# Patient Record
Sex: FEMALE | Race: WHITE | Employment: UNEMPLOYED | URBAN - NONMETROPOLITAN AREA
[De-identification: names, ages, dates, MRNs, and addresses within clinical notes are randomized per-mention and may not be internally consistent; named-entity substitution may affect disease eponyms.]

---
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
## 2022-01-19 ENCOUNTER — TELEPHONE (OUTPATIENT)
Dept: FAMILY MEDICINE CLINIC | Age: 22
End: 2022-01-19

## 2022-01-19 DIAGNOSIS — Z11.52 ENCOUNTER FOR SCREENING FOR COVID-19: Primary | ICD-10-CM

## 2022-01-19 PROCEDURE — 87635 SARS-COV-2 COVID-19 AMP PRB: CPT | Performed by: NURSE PRACTITIONER

## 2022-01-20 ENCOUNTER — TELEPHONE (OUTPATIENT)
Dept: FAMILY MEDICINE CLINIC | Age: 22
End: 2022-01-20

## 2022-01-20 ENCOUNTER — NURSE ONLY (OUTPATIENT)
Dept: FAMILY MEDICINE CLINIC | Age: 22
End: 2022-01-20
Payer: COMMERCIAL

## 2022-01-20 DIAGNOSIS — J30.9 ALLERGIC RHINITIS, UNSPECIFIED SEASONALITY, UNSPECIFIED TRIGGER: ICD-10-CM

## 2022-01-20 LAB
BASOPHILS # BLD: 0.4 %
BASOPHILS ABSOLUTE: 0 THOU/MM3 (ref 0–0.1)
EOSINOPHIL # BLD: 2 %
EOSINOPHILS ABSOLUTE: 0.2 THOU/MM3 (ref 0–0.4)
ERYTHROCYTE [DISTWIDTH] IN BLOOD BY AUTOMATED COUNT: 13.5 % (ref 11.5–14.5)
ERYTHROCYTE [DISTWIDTH] IN BLOOD BY AUTOMATED COUNT: 46.2 FL (ref 35–45)
HCT VFR BLD CALC: 40.4 % (ref 37–47)
HEMOGLOBIN: 13 GM/DL (ref 12–16)
IMMATURE GRANS (ABS): 0.02 THOU/MM3 (ref 0–0.07)
IMMATURE GRANULOCYTES: 0.2 %
LYMPHOCYTES # BLD: 35.9 %
LYMPHOCYTES ABSOLUTE: 3.2 THOU/MM3 (ref 1–4.8)
Lab: NORMAL
MCH RBC QN AUTO: 29.9 PG (ref 26–33)
MCHC RBC AUTO-ENTMCNC: 32.2 GM/DL (ref 32.2–35.5)
MCV RBC AUTO: 92.9 FL (ref 81–99)
MONOCYTES # BLD: 6 %
MONOCYTES ABSOLUTE: 0.5 THOU/MM3 (ref 0.4–1.3)
NUCLEATED RED BLOOD CELLS: 0 /100 WBC
PLATELET # BLD: 331 THOU/MM3 (ref 130–400)
PMV BLD AUTO: 10.3 FL (ref 9.4–12.4)
QC PASS/FAIL: NORMAL
RBC # BLD: 4.35 MILL/MM3 (ref 4.2–5.4)
SARS-COV-2 RDRP RESP QL NAA+PROBE: NEGATIVE
SEG NEUTROPHILS: 55.5 %
SEGMENTED NEUTROPHILS ABSOLUTE COUNT: 4.9 THOU/MM3 (ref 1.8–7.7)
WBC # BLD: 8.9 THOU/MM3 (ref 4.8–10.8)

## 2022-01-20 PROCEDURE — 36415 COLL VENOUS BLD VENIPUNCTURE: CPT | Performed by: FAMILY MEDICINE

## 2022-01-20 NOTE — TELEPHONE ENCOUNTER
Patient got ears pierced (4 piercings) 3 weeks ago. Patient replaced original earrings 1 1/2 wks ago and since then 3 of the 4 have been bleeding when she takes them out to clean the piercing areas and is wondering if she needs an antibiotic cream.  Currently using bacitracin daily on the areas. Replacements are lower quality than the original earrings. Please advise. Thank you.

## 2022-01-20 NOTE — LETTER
NOTIFICATION RETURN TO WORK / SCHOOL    1/20/2022    Ms[address] [facility]  [address] [address]      To Whom It May Concern:    Jacinta Yeager was tested for COVID-19 on 1-20-22, and the result was negative. If there are questions or concerns, please have the patient contact our office.         Sincerely,      Margy Mullins

## 2022-01-21 LAB
IGA: 258 MG/DL (ref 70–400)
IGE: 2 IU/ML
IGG: 1366 MG/DL (ref 700–1600)
IGM: 76 MG/DL (ref 40–230)

## 2022-01-21 NOTE — TELEPHONE ENCOUNTER
I believe she is not supposed to take out the original piercing for 12 weeks. She should take out the current earrings and put back in the original. Clean the areas without taking them out eat time. Use the cleaning solution provided by the place she got the piercing.    If she has any concerns she will need to come into the office to be seen

## 2022-01-23 LAB — DIPHTHERIA ANTIBODY: NORMAL

## 2022-01-24 LAB — MISC. #1 REFERENCE GROUP TEST: NORMAL

## 2022-03-09 ENCOUNTER — NURSE TRIAGE (OUTPATIENT)
Dept: OTHER | Facility: CLINIC | Age: 22
End: 2022-03-09
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
## 2022-05-04 ENCOUNTER — E-VISIT (OUTPATIENT)
Dept: FAMILY MEDICINE CLINIC | Age: 22
End: 2022-05-04
Payer: COMMERCIAL

## 2022-05-04 DIAGNOSIS — J01.90 ACUTE SINUSITIS, RECURRENCE NOT SPECIFIED, UNSPECIFIED LOCATION: Primary | ICD-10-CM

## 2022-05-04 PROCEDURE — 99421 OL DIG E/M SVC 5-10 MIN: CPT | Performed by: FAMILY MEDICINE

## 2022-05-04 ASSESSMENT — LIFESTYLE VARIABLES: SMOKING_STATUS: NO, I'VE NEVER SMOKED

## 2022-05-05 ENCOUNTER — TELEPHONE (OUTPATIENT)
Dept: FAMILY MEDICINE CLINIC | Age: 22
End: 2022-05-05

## 2022-05-05 NOTE — TELEPHONE ENCOUNTER
Patient called. Stated that ear piercing site is infected. Offered appointment but declined as she is at [facility] in [address] [place], [place] and not in [facility] this week. Advised to reach out to medical personnel on campus or in [address].

## 2022-05-05 NOTE — PATIENT INSTRUCTIONS
Patient Education        Saline Nasal Washes: Care Instructions  Overview  Patient Education        Sinusitis: Care Instructions  Your Care Instructions     Sinusitis is an infection of the lining of the sinus cavities in your head. Sinusitis often follows a cold. It causes pain and pressure in your head andface. In most cases, sinusitis gets better on its own in 1 to 2 weeks. But some mildsymptoms may last for several weeks. Sometimes antibiotics are needed. Follow-up care is a key part of your treatment and safety. Be sure to make and go to all appointments, and call your doctor if you are having problems. It's also a good idea to know your test results and keep alist of the medicines you take. How can you care for yourself at home?  Take an over-the-counter pain medicine, such as acetaminophen (Tylenol), ibuprofen (Advil, Motrin), or naproxen (Aleve). Read and follow all instructions on the label.  If the doctor prescribed antibiotics, take them as directed. Do not stop taking them just because you feel better. You need to take the full course of antibiotics.  Be careful when taking over-the-counter cold or flu medicines and Tylenol at the same time. Many of these medicines have acetaminophen, which is Tylenol. Read the labels to make sure that you are not taking more than the recommended dose. Too much acetaminophen (Tylenol) can be harmful.  Breathe warm, moist air from a steamy shower, a hot bath, or a sink filled with hot water. Avoid cold, dry air. Using a humidifier in your home may help. Follow the directions for cleaning the machine.  Use saline (saltwater) nasal washes. This can help keep your nasal passages open and wash out mucus and bacteria. You can buy saline nose drops at a grocery store or drugstore. Or you can make your own at home by adding 1 teaspoon of salt and 1 teaspoon of baking soda to 2 cups of distilled water.  If you make your own, fill a bulb syringe with the solution, insert the tip into your nostril, and squeeze gently. Ferdie Hammersmith your nose.  Put a hot, wet towel or a warm gel pack on your face 3 or 4 times a day for 5 to 10 minutes each time.  Try a decongestant nasal spray like oxymetazoline (Afrin). Do not use it for more than 3 days in a row. Using it for more than 3 days can make your congestion worse. When should you call for help? Call your doctor now or seek immediate medical care if:     You have new or worse swelling or redness in your face or around your eyes.      You have a new or higher fever. Watch closely for changes in your health, and be sure to contact your doctor if:     You have new or worse facial pain.      The mucus from your nose becomes thicker (like pus) or has new blood in it.      You are not getting better as expected. Where can you learn more? Go to https://[facility] [facility].ReClaims. org and sign in to your Geostellar account. Enter K099 in the My Best Interest box to learn more about \"Sinusitis: Care Instructions. \"     If you do not have an account, please click on the \"Sign Up Now\" link. Current as of: September 8, 2021               Content Version: 13.2  © 2006-2022 Healthwise, Incorporated. Care instructions adapted under license by [facility] ([facility]). If you have questions about a medical condition or this instruction, always ask your healthcare professional. [place] [address] any warranty or liability for your use of this information. Saline nasal washes help keep the nasal passages open by washing out thick or dried mucus. This simple remedy can help relieve symptoms of allergies, sinusitis, and colds. It also can make the nose feel more comfortable by keeping the mucous membranes moist. You may notice a little burning sensation in your nose the first few times you use the solution, but this usually getsbetter in a few days. Follow-up care is a key part of your treatment and safety.  Be sure to make and go to all appointments, and call your doctor if you are having problems. It's also a good idea to know your test results and keep alist of the medicines you take. How can you care for yourself at home?  You can buy premixed saline solution in a squeeze bottle or other sinus rinse products at a drugstore. Read and follow the instructions on the label.  You also can make your own saline solution by adding 1 teaspoon of non-iodized salt and 1 teaspoon of baking soda to 2 cups of distilled or boiled and cooled water.  If you use a homemade solution, use a squeeze bottle or neti pot to get the solution into your nose. Room temperature or slightly warmed water may be more comfortable. Make sure it isn't hot.  Stand over the sink with your head tilted forward and slightly to one side. Put only the tip of the syringe or squeeze bottle into the nostril that is farther away from the sink. (The nostril closest to the sink will drain the fluid.) Gently squirt the solution into the nostril and toward the back of your head with your mouth open. The solution should flow out the other nostril. Repeat on the other side. Some sneezing and gagging are normal at first.   Gently blow your nose.  Clean the syringe or bottle after each use.  Repeat this 2 or 3 times a day.  Use nasal washes gently if you have nosebleeds often. When should you call for help? Watch closely for changes in your health, and be sure to contact your doctor if:     Your symptoms do not get better.      You have problems doing the nasal washes. Where can you learn more? Go to https://Facisharetomeweb.XPlace. org and sign in to your Latiot account. Enter [phone] [id number] [phone] in the [facility] box to learn more about \"Saline Nasal Washes: Care Instructions. \"     If you do not have an account, please click on the \"Sign Up Now\" link.   Current as of: September 8, 2021               Content Version: 13.2  © 6701-6734 Healthwise, Incorporated. Care instructions adapted under license by [facility] ([facility]). If you have questions about a medical condition or this instruction, always ask your healthcare professional. [address] any warranty or liability for your use of this information.

## 2022-05-07 NOTE — TELEPHONE ENCOUNTER
Apparently pre-service had to told patient she could not be seen virtually. Went to urgent care. Apologized for miscommunication. Will forward to  for review.

## 2022-05-09 DIAGNOSIS — B37.9 YEAST INFECTION: Primary | ICD-10-CM

## 2022-05-09 RX ORDER — FLUCONAZOLE 150 MG/1
150 TABLET ORAL ONCE
Qty: 2 TABLET | Refills: 0 | Status: SHIPPED | OUTPATIENT
Start: 2022-05-09 | End: 2022-05-09

## 2022-05-12 ENCOUNTER — TELEPHONE (OUTPATIENT)
Dept: FAMILY MEDICINE CLINIC | Age: 22
End: 2022-05-12

## 2022-05-12 NOTE — TELEPHONE ENCOUNTER
----- Message from [employer] sent at 5/12/2022  3:02 PM EDT -----  Subject: Appointment Request    Reason for Call: Urgent (Patient Request) ED Follow Up Visit    QUESTIONS  Type of Appointment? Established Patient  Reason for appointment request? Available appointments did not meet   patient need  Additional Information for Provider? Pt is away at college and would like   to set-up a [facility] appt tomorrow if possible, Pt was at the Urgent care on   Sun. 5/8/2022, strep throat, Pt is still feeling dizzy.   ---------------------------------------------------------------------------  --------------  CALL BACK INFO  What is the best way for the office to contact you? OK to leave message on   voicemail  Preferred Call Back Phone Number? [phone]  ---------------------------------------------------------------------------  --------------  SCRIPT ANSWERS  Relationship to Patient? Self  (Patient requests to see provider urgently. )? Yes  Have you been diagnosed with, awaiting test results for, or told that you   are suspected of having COVID-19 (Coronavirus)? (If patient has tested   negative or was tested as a requirement for work, school, or travel and   not based on symptoms, answer no)? No  Within the past 10 days have you developed any of the following symptoms   (answer no if symptoms have been present longer than 10 days or began   more than 10 days ago)? Fever or Chills, Cough, Shortness of breath or   difficulty breathing, Loss of taste or smell, Sore throat, Nasal   congestion, Sneezing or runny nose, Fatigue or generalized body aches   (answer no if pain is specific to a body part e.g. back pain), Diarrhea,   Headache?  Yes

## 2022-05-16 ENCOUNTER — OFFICE VISIT (OUTPATIENT)
Dept: FAMILY MEDICINE CLINIC | Age: 22
End: 2022-05-16
Payer: COMMERCIAL

## 2022-05-16 VITALS
SYSTOLIC BLOOD PRESSURE: 110 MMHG | HEART RATE: 91 BPM | RESPIRATION RATE: 16 BRPM | TEMPERATURE: 98 F | DIASTOLIC BLOOD PRESSURE: 70 MMHG | OXYGEN SATURATION: 98 % | HEIGHT: 64 IN | WEIGHT: 103 LBS | BODY MASS INDEX: 17.58 KG/M2

## 2022-05-16 DIAGNOSIS — J03.01 ACUTE RECURRENT STREPTOCOCCAL TONSILLITIS: Primary | ICD-10-CM

## 2022-05-16 DIAGNOSIS — J03.90 EXUDATIVE TONSILLITIS: ICD-10-CM

## 2022-05-16 DIAGNOSIS — F41.9 ANXIETY: ICD-10-CM

## 2022-05-16 LAB — STREPTOCOCCUS A RNA: NEGATIVE

## 2022-05-16 PROCEDURE — 1036F TOBACCO NON-USER: CPT | Performed by: NURSE PRACTITIONER

## 2022-05-16 PROCEDURE — 99214 OFFICE O/P EST MOD 30 MIN: CPT | Performed by: NURSE PRACTITIONER

## 2022-05-16 PROCEDURE — G8427 DOCREV CUR MEDS BY ELIG CLIN: HCPCS | Performed by: NURSE PRACTITIONER

## 2022-05-16 PROCEDURE — G8419 CALC BMI OUT NRM PARAM NOF/U: HCPCS | Performed by: NURSE PRACTITIONER

## 2022-05-16 PROCEDURE — 87651 STREP A DNA AMP PROBE: CPT | Performed by: NURSE PRACTITIONER

## 2022-05-16 RX ORDER — PREDNISONE 20 MG/1
20 TABLET ORAL 2 TIMES DAILY
Qty: 10 TABLET | Refills: 0 | Status: SHIPPED | OUTPATIENT
Start: 2022-05-16 | End: 2022-05-21

## 2022-05-16 RX ORDER — CYCLOBENZAPRINE HCL 5 MG
5 TABLET ORAL 3 TIMES DAILY PRN
COMMUNITY
Start: 2022-05-05 | End: 2022-07-20

## 2022-05-16 RX ORDER — AMOXICILLIN AND CLAVULANATE POTASSIUM 875; 125 MG/1; MG/1
1 TABLET, FILM COATED ORAL EVERY 12 HOURS
COMMUNITY
Start: 2022-05-08 | End: 2022-07-20

## 2022-05-16 ASSESSMENT — PATIENT HEALTH QUESTIONNAIRE - PHQ9
1. LITTLE INTEREST OR PLEASURE IN DOING THINGS: 0
2. FEELING DOWN, DEPRESSED OR HOPELESS: 0
SUM OF ALL RESPONSES TO PHQ QUESTIONS 1-9: 0
9. THOUGHTS THAT YOU WOULD BE BETTER OFF DEAD, OR OF HURTING YOURSELF: 0
SUM OF ALL RESPONSES TO PHQ QUESTIONS 1-9: 0
SUM OF ALL RESPONSES TO PHQ QUESTIONS 1-9: 0
7. TROUBLE CONCENTRATING ON THINGS, SUCH AS READING THE NEWSPAPER OR WATCHING TELEVISION: 0
8. MOVING OR SPEAKING SO SLOWLY THAT OTHER PEOPLE COULD HAVE NOTICED. OR THE OPPOSITE, BEING SO FIGETY OR RESTLESS THAT YOU HAVE BEEN MOVING AROUND A LOT MORE THAN USUAL: 0
4. FEELING TIRED OR HAVING LITTLE ENERGY: 0
SUM OF ALL RESPONSES TO PHQ QUESTIONS 1-9: 0
10. IF YOU CHECKED OFF ANY PROBLEMS, HOW DIFFICULT HAVE THESE PROBLEMS MADE IT FOR YOU TO DO YOUR WORK, TAKE CARE OF THINGS AT HOME, OR GET ALONG WITH OTHER PEOPLE: 0
5. POOR APPETITE OR OVEREATING: 0
6. FEELING BAD ABOUT YOURSELF - OR THAT YOU ARE A FAILURE OR HAVE LET YOURSELF OR YOUR FAMILY DOWN: 0
3. TROUBLE FALLING OR STAYING ASLEEP: 0
SUM OF ALL RESPONSES TO PHQ9 QUESTIONS 1 & 2: 0

## 2022-05-16 ASSESSMENT — ENCOUNTER SYMPTOMS
SHORTNESS OF BREATH: 0
SINUS PAIN: 1
CHEST TIGHTNESS: 0
EYE ITCHING: 0
VOMITING: 0
SORE THROAT: 1
CHOKING: 0
NAUSEA: 0
COLOR CHANGE: 0
BACK PAIN: 0
SINUS PRESSURE: 1
ABDOMINAL DISTENTION: 0
EYE DISCHARGE: 0
COUGH: 0
ABDOMINAL PAIN: 0
VOICE CHANGE: 0
EYE PAIN: 0
WHEEZING: 0
CONSTIPATION: 0

## 2022-05-16 NOTE — PROGRESS NOTES
[address] [address]  Dept: [phone]  Loc: [facility] Marshal Cornelius (:  2000) is a 25 y.o. female,Established patient, here for evaluation of the following chief complaint(s):  Head Congestion (on antibiotics for strep ), Ear Fullness, and Dizziness      ASSESSMENT/PLAN:  1. Acute recurrent streptococcal tonsillitis  -     POCT Rapid Strep A DNA (Alere i)  -     predniSONE (DELTASONE) 20 MG tablet; Take 1 tablet by mouth 2 times daily for 5 days, Disp-10 tablet, R-0Normal  2. Exudative tonsillitis  -     predniSONE (DELTASONE) 20 MG tablet; Take 1 tablet by mouth 2 times daily for 5 days, Disp-10 tablet, R-0Normal  3. Anxiety  Stable. Denies any SI/HI    Pt non toxic appearing and in no acute distress. Patient treated for strep today is day 8 she has been taking Augmentin, states she is still struggling with a lot of congestion and mucus and also her throat is still a bit sore. Did re-check strep test and it was negative. Patient instructed to continue Augmentin, was given prednisone 5-day course. Return in about 1 week (around 2022), or if symptoms worsen or fail to improve. SUBJECTIVE/OBJECTIVE:  Testes for strep last  at school and started Augmentin. Now having heavy congestion and dizziness. Having a lot of mucus. Did change toothbrush. has a past medical history of Allergy, Anxiety, Mono exposure, and Strep throat. Review of Systems   Constitutional: Negative for appetite change, chills, fatigue and fever. HENT: Positive for congestion, sinus pressure, sinus pain and sore throat. Negative for ear discharge, tinnitus and voice change. Eyes: Negative for pain, discharge, itching and visual disturbance. Respiratory: Negative for cough, choking, chest tightness, shortness of breath and wheezing.     Cardiovascular: Negative for chest pain, palpitations and leg swelling. Gastrointestinal: Negative for abdominal distention, abdominal pain, constipation, nausea and vomiting. Endocrine: Negative for cold intolerance and heat intolerance. Genitourinary: Negative for dysuria, hematuria, vaginal discharge and vaginal pain. Musculoskeletal: Negative for arthralgias, back pain, gait problem, neck pain and neck stiffness. Skin: Negative for color change and rash. Neurological: Negative for dizziness, syncope, speech difficulty, light-headedness, numbness and headaches. Psychiatric/Behavioral: Negative for behavioral problems, confusion, self-injury and suicidal ideas. The patient is not nervous/anxious. Physical Exam  Vitals and nursing note reviewed. Constitutional:       General: She is not in acute distress. Appearance: Normal appearance. She is well-developed. She is not diaphoretic. HENT:      Head: Normocephalic and atraumatic. Right Ear: Tympanic membrane and external ear normal. Tympanic membrane is not injected or erythematous. Left Ear: Tympanic membrane and external ear normal. Tympanic membrane is not injected or erythematous. Nose:      Right Sinus: Maxillary sinus tenderness and frontal sinus tenderness present. Left Sinus: Maxillary sinus tenderness and frontal sinus tenderness present. Mouth/Throat:      Pharynx: Uvula midline. Posterior oropharyngeal erythema present. Tonsils: Tonsillar exudate present. 4+ on the right. 4+ on the left. Eyes:      General:         Right eye: No discharge. Left eye: No discharge. Conjunctiva/sclera: Conjunctivae normal.      Pupils: Pupils are equal, round, and reactive to light. Neck:      Thyroid: No thyromegaly. Trachea: Trachea normal.   Cardiovascular:      Rate and Rhythm: Normal rate and regular rhythm. Pulses: Normal pulses. Heart sounds: Normal heart sounds, S1 normal and S2 normal. No murmur heard.   No friction rub. No gallop. Pulmonary:      Effort: Pulmonary effort is normal. No respiratory distress. Breath sounds: Normal breath sounds. Abdominal:      General: Bowel sounds are normal.      Palpations: Abdomen is soft. Musculoskeletal:         General: No tenderness or deformity. Normal range of motion. Cervical back: Full passive range of motion without pain, normal range of motion and neck supple. No rigidity or tenderness. Lymphadenopathy:      Cervical: No cervical adenopathy. Skin:     General: Skin is warm and dry. Capillary Refill: Capillary refill takes less than 2 seconds. Neurological:      Mental Status: She is alert and oriented to person, place, and time. Deep Tendon Reflexes: Reflexes are normal and symmetric. Psychiatric:         Mood and Affect: Mood normal.         Behavior: Behavior normal.             An electronic signature was used to authenticate this note.     --BRENDA Sanchez - CNP

## 2022-05-16 NOTE — PATIENT INSTRUCTIONS
Patient Education        Tonsillitis: Care Instructions  Overview     Tonsillitis is an infection of the tonsils that is caused by bacteria or a virus. The tonsils are in the back of the throat and are part of the immunesystem. Tonsillitis typically lasts from a few days up to a couple of weeks. Tonsillitis caused by a virus goes away on its own. Tonsillitis caused by thebacteria that causes strep throat is treated with antibiotics. You and your doctor may consider surgery to remove the tonsils (tonsillectomy)if you have serious complications or repeat infections. Follow-up care is a key part of your treatment and safety. Be sure to make and go to all appointments, and call your doctor if you are having problems. It's also a good idea to know your test results and keep alist of the medicines you take. How can you care for yourself at home? Home care can help with a sore throat and other symptoms. Here are some thingsyou can do to help yourself feel better.  If your doctor prescribed antibiotics, take them as directed. Do not stop taking them just because you feel better. You need to take the full course of antibiotics.  Gargle with warm salt water. This helps reduce swelling and relieve discomfort. Gargle once an hour with 1 teaspoon of salt mixed in 8 fluid ounces of warm water.  Take an over-the-counter pain medicine, such as acetaminophen (Tylenol), ibuprofen (Advil, Motrin), or naproxen (Aleve). Be safe with medicines. Read and follow all instructions on the label. No one younger than 20 should take aspirin. It has been linked to Reye syndrome, a serious illness.  Be careful when taking over-the-counter cold or flu medicines and Tylenol at the same time. Many of these medicines have acetaminophen, which is Tylenol. Read the labels to make sure that you are not taking more than the recommended dose. Too much acetaminophen (Tylenol) can be harmful.    Try lozenges or an over-the-counter throat spray to relieve throat pain.  Drink plenty of fluids. Fluids may help soothe an irritated throat. Drink warm or cool liquids (whichever feels better). These include tea, soup, and juice.  Do not smoke, and avoid secondhand smoke. Smoking can make tonsillitis worse. If you need help quitting, talk to your doctor about stop-smoking programs and medicines. These can increase your chances of quitting for good.  Use a vaporizer or humidifier to add moisture to your bedroom. Follow the directions for cleaning the machine.  Get plenty of rest.  When should you call for help? Call your doctor now or seek immediate medical care if:     Your pain gets worse on one side of your throat.      You have a new or higher fever.      You notice changes in your voice.      You have trouble opening your mouth.      You have any trouble breathing.      You have much more trouble swallowing.      You have a fever with a stiff neck or a severe headache.      You are sensitive to light or feel very sleepy or confused. Watch closely for changes in your health, and be sure to contact your doctor if:     You do not get better after 2 days. Where can you learn more? Go to https://TeqcyclepeTransEnergyeb.Deliveroo. org and sign in to your InGaugeIt account. Enter X067 in the TapFunder box to learn more about \"Tonsillitis: Care Instructions. \"     If you do not have an account, please click on the \"Sign Up Now\" link. Current as of: September 8, 2021               Content Version: 13.2  © 2006-2022 Healthwise, [facility]. Care instructions adapted under license by [facility] ([facility]). If you have questions about a medical condition or this instruction, always ask your healthcare professional. [place] [address] any warranty or liability for your use of this information.

## 2022-05-18 NOTE — TELEPHONE ENCOUNTER
Okay to come in today. Hydroxychloroquine Pregnancy And Lactation Text: This medication has been shown to cause fetal harm but it isn't assigned a Pregnancy Risk Category. There are small amounts excreted in breast milk.

## 2022-05-26 ENCOUNTER — HOSPITAL ENCOUNTER (OUTPATIENT)
Dept: PHYSICAL THERAPY | Age: 22
Setting detail: THERAPIES SERIES
Discharge: HOME | End: 2022-05-26
Payer: COMMERCIAL

## 2022-05-26 PROCEDURE — 97140 MANUAL THERAPY 1/> REGIONS: CPT

## 2022-05-26 PROCEDURE — 97162 PT EVAL MOD COMPLEX 30 MIN: CPT

## 2022-05-26 NOTE — FLOWSHEET NOTE
helping to a point but the pain was still present and she found out that her jaw was not aligned properly and she would need surgery.  had surgery on her jaw to pull the bottom jaw forward and realign almost one year ago.  is still wearing rubber bands and the orthodontist is doing work to keep trying to realign everything.  still having pain and her surgeon wanted her to try therapy due to continued tight muscles. Gilda has muscle relaxers but does not take often due to they make her too tired. Social/Functional History and Current Status:  Medications and Allergies have been reviewed and are listed on Medical History Questionnaire. [place] [facility] R[facility]59 lives with family in a single story home with a level surface to enter. Task Previous Current   ADLs  Independent Independent   IADL's Independent Modified Independent   Ambulation Independent Independent   Transfers Independent Independent   Recreation Independent Modified Independent   Community Integration Independent Independent   Driving Active  Active    Work college student  College student     OBJECTIVE:    Pain: 6-8/10 jaw pain   Palpation Mild to moderate tenderness to palpation of neck muscles and internal jaw muscles. Observation Mild deviation of jaw with opening and closing plus popping felt on right with opening. Uneven TMJ motion between sides. Posture Fair        Range of Motion Jaw opening 47 mm and lateral deviation 10 mm each way - pt reported tougher to go to the right. WFL cervical motion all directions   Strength    Coordination    Sensation States having headaches that are in her forehead and bilat temples. 3-4x/week and can last all day or can be managed with medication.    Bed Mobility    Transfers    Ambulation    Stairs    Balance    Special Tests          TREATMENT   Precautions: Be careful eating chewy foods   Pain: 6/10 in jaw    X in shaded column indicates activity completed today   Modalities Parameters/  Location  Notes                     Manual Therapy Time/Technique  Notes   Soft tissue and TP work to internal jaw musculature focusing on right side. Myofascial work to bilateral sides neck. X Increased tightness in internal jaw more to right side and along both sides of neck+               Exercise/Intervention   Notes   Educated on doing manual therapy inside mouth and doing rot/SB/Levator stretches for neck   X                                                                            Specific Interventions Next Treatment: manual therapy to cervical area and jaw, posture education, stretching and strengthening    Activity/Treatment Tolerance:  [x]  Patient tolerated treatment well  []  Patient limited by fatigue  []  Patient limited by pain   []  Patient limited by medical complications  []  Other:     Assessment: Patient with long history of TMJ issues that finally ended up with a surgery to pull bottom of jaw forward. Orthodontist is still working with patient on jaw alignment. Patient is still having pain in jaw one year post surgery so is coming to therapy. Patient still has tight musculature around jaw and neck that could be still causing problems because were not addressed after surgery and needed stretched and relaxed to accomodate to new jaw position. Patient still has uneven TMJ motion also. Patient would benefit from therapy to work on getting tight musculature calmed down and restoring even TMJ motion. Patient may still have pop[ing after therapy is done but will have to wait to see. Body Structures/Functions/Activity Limitations: impaired activity tolerance, impaired ROM and pain  Prognosis: good    GOALS:  Patient Goal: To get muscles more relaxed and see if helps with her pain.     Short Term Goals:  Time Frame: 4 weeks  1) Patient to report 25-50% decrease in jaw pain for ease with talking and singing  2) Patient to demonstrate even TMJ motion with full jaw opening to decrease stress on joints when eating  3) Patient to be fully educated on stretching and strengthening for mobility and stability  4) Patient to demonstrate 50-75% decrease in neck tightness for decreased stress on her joints. 5) Patient to report able to eat chewier foods without pain in her jaw      Long Term Goals:  Time Frame: 12 weeks  1) Patient to be independent with home program to perform all eating and talking with minimal to no pain and popping in jaw       Patient Education:   [x]  HEP/Education Completed: Plan of Care, Goals, HEP above   [address] Access Code:  []  No new Education completed  []  Reviewed Prior HEP      [x]  Patient verbalized and/or demonstrated understanding of education provided. []  Patient unable to verbalize and/or demonstrate understanding of education provided. Will continue education. []  Barriers to learning: None    PLAN:  Treatment Recommendations: Strengthening, Range of Motion, Manual Therapy - Soft Tissue Mobilization, Pain Management, Home Exercise Program, Patient Education, Integrative Dry Needling and Modalities    [x]  Plan of care initiated. Plan to see patient 1 times per week for 12 weeks to address the treatment planned outlined above.   []  Continue with current plan of care  []  Modify plan of care as follows:    []  Hold pending physician visit  []  Discharge    Time In 1020   Time Out 1120   Timed Code Minutes: 15 min   Total Treatment Time: 60 min       Electronically Signed by: Odell Hoffmann [place] [id number]

## 2022-05-31 ENCOUNTER — HOSPITAL ENCOUNTER (OUTPATIENT)
Dept: PHYSICAL THERAPY | Age: 22
Setting detail: THERAPIES SERIES
Discharge: HOME | End: 2022-05-31
Payer: COMMERCIAL

## 2022-05-31 PROBLEM — F41.9 ANXIETY: Status: ACTIVE | Noted: 2022-05-31

## 2022-05-31 PROCEDURE — 97110 THERAPEUTIC EXERCISES: CPT

## 2022-05-31 PROCEDURE — 97140 MANUAL THERAPY 1/> REGIONS: CPT

## 2022-05-31 NOTE — PROGRESS NOTES
[address] [facility]  PHYSICAL THERAPY  [] EVALUATION  [x] DAILY NOTE (LAND) [] DAILY NOTE (AQUATIC ) [] PROGRESS NOTE [] DISCHARGE NOTE    [] [address] [facility]   [] [address]    [x] [facility]   [] Malachi Teague    Date: 2022  Patient Name:  Nilsa Larson  : 2000  MRN: [id number]  CSN: [id number]    Referring Practitioner BRENDA Booker -*   Diagnosis Other specified disorders of temporomandibular joint [M26.69]    Treatment Diagnosis Jaw pain   Date of Evaluation 22    Additional Pertinent History Jaw surgery one year ago      Functional Outcome Measure Used TMD   Functional Outcome Score 16/40 for 40% disability (22)       Insurance: Primary: Payor: Ree Franklin /  /  / ,   Secondary: [facility]   Authorization Information: Allowed 30 visits    Visit # 2, 2/10 for progress note   Visits Allowed: 30 hard limit   Recertification Date: 35   Physician Follow-Up: None with surgeon. Follow up with orthodontist  In one month. Physician Orders:    History of Present Illness:      SUBJECTIVE: Patient reports has been having trouble getting her rubber bands to stay in place in her mouth but finally got them in last night and her jaw feels better with them. States has been trying to do stretches but did not have time over the weekend. Reports things just feel tight today which is causing pain. TREATMENT   Precautions: Be careful eating chewy foods   Pain: 4/10 in jaw    X in shaded column indicates activity completed today   Modalities Parameters/  Location  Notes                     Manual Therapy Time/Technique  Notes   Soft tissue and TP work to internal jaw musculature to bilat sides and tightness noted more along upper jaw than lower jaw. Myofascial work to bilateral sides neck with tightness noted more distally on right side and at attachment on left.   X    Mobilization for distraction and lateral motion on right side 2x10 each X Patient still with popping in jaw but not as loud and movement of bilat TMJ felt more equal after this treatment         Exercise/Intervention   Notes   Educated on doing manual therapy inside mouth and doing rot/SB/Levator stretches for neck   X                                                                            Specific Interventions Next Treatment: manual therapy to cervical area and jaw, posture education, stretching and strengthening    Activity/Treatment Tolerance:  [x]  Patient tolerated treatment well  []  Patient limited by fatigue  []  Patient limited by pain   []  Patient limited by medical complications  []  Other:     Assessment: Patient with tightness noted both side of neck and both sides internally of jaw. Patient with improved and more equal mobility of TMJ in middle of session after jaw work but then was more uneven after work on neck musculature. Will continue to work on tightness to both side of jaw are even and can then get more equal motion of bilateral TMJ. Then can progress to strengthening. Unsure how much better popping will get. GOALS:  Patient Goal: To get muscles more relaxed and see if helps with her pain. Short Term Goals:  Time Frame: 4 weeks  1) Patient to report 25-50% decrease in jaw pain for ease with talking and singing  2) Patient to demonstrate even TMJ motion with full jaw opening to decrease stress on joints when eating  3) Patient to be fully educated on stretching and strengthening for mobility and stability  4) Patient to demonstrate 50-75% decrease in neck tightness for decreased stress on her joints.   5) Patient to report able to eat chewier foods without pain in her jaw      Long Term Goals:  Time Frame: 12 weeks  1) Patient to be independent with home program to perform all eating and talking with minimal to no pain and popping in jaw       Patient Education:   [x]  HEP/Education Completed: Continue with manual work and stretches.  [employer] Access Code:  []  No new Education completed  []  Reviewed Prior HEP      [x]  Patient verbalized and/or demonstrated understanding of education provided. []  Patient unable to verbalize and/or demonstrate understanding of education provided. Will continue education. []  Barriers to learning: None    PLAN:  []  Plan of care initiated. Plan to see patient 1 times per week for 12 weeks to address the treatment planned outlined above.   [x]  Continue with current plan of care  []  Modify plan of care as follows:    []  Hold pending physician visit  []  Discharge    Time In 1045   Time Out 1130   Timed Code Minutes: 45 min   Total Treatment Time: 45 min       Electronically Signed by: [place] [id number]

## 2022-06-06 ENCOUNTER — APPOINTMENT (OUTPATIENT)
Dept: PHYSICAL THERAPY | Age: 22
End: 2022-06-06
Payer: COMMERCIAL

## 2022-06-08 ENCOUNTER — HOSPITAL ENCOUNTER (OUTPATIENT)
Dept: PHYSICAL THERAPY | Age: 22
Setting detail: THERAPIES SERIES
Discharge: HOME | End: 2022-06-08
Payer: COMMERCIAL

## 2022-06-08 PROCEDURE — 97140 MANUAL THERAPY 1/> REGIONS: CPT

## 2022-06-08 NOTE — PROGRESS NOTES
[address] [facility]  PHYSICAL THERAPY  [] EVALUATION  [x] DAILY NOTE (LAND) [] DAILY NOTE (AQUATIC ) [] PROGRESS NOTE [] DISCHARGE NOTE    [] [address] [facility]   [] [address]    [x] [facility]   [] Leonides Ulloa    Date: 2022  Patient Name:  Charlette Owen  : 2000  MRN: [id number]  CSN: [id number]    Referring Practitioner BRENDA Linares -*   Diagnosis Other specified disorders of temporomandibular joint [M26.69]    Treatment Diagnosis Jaw pain   Date of Evaluation 22    Additional Pertinent History Jaw surgery one year ago      Functional Outcome Measure Used TMD   Functional Outcome Score 16/40 for 40% disability (22)       Insurance: Primary: Payor: Urban Hutton /  /  / ,   Secondary: [facility]   Authorization Information: Allowed 30 visits    Visit # 3, 3/10 for progress note   Visits Allowed: 30 hard limit   Recertification Date: 92   Physician Follow-Up: None with surgeon. Follow up with orthodontist in one month. Physician Orders:    History of Present Illness:      SUBJECTIVE: Patient reports is still having good and bad days and today is a bad day. States everything is tight and her pain is higher. States she still has pain every day that leads to increased stress and then external stressors make things worse. Reports is trying to get external stressors under control but she still has constant pain. TREATMENT   Precautions: Be careful eating chewy foods   Pain: 7/10 in jaw    X in shaded column indicates activity completed today   Modalities Parameters/  Location  Notes                     Manual Therapy Time/Technique  Notes   Soft tissue and TP work to internal jaw musculature to bilat sides. Myofascial work to bilateral sides neck. X Tightness noted throughout all of right side internally especially posterior to back molars.  Tightness noted more along upper jaw and into masseter on left side internally. Tightness noted more along SCM and levator on left along neck. Mobilization for distraction and lateral motion on right side then also added in lateral mobilization to left side also. 2x10 each X Patient with deviation of mouth to right side with talking and a \"s\" curve with opening and closing. She is getting initial movement first on left with TMJ with popping then motion starts on right and she gets a louder pop on right side. Exercise/Intervention   Notes   Educated on doing manual therapy inside mouth and doing rot/SB/Levator stretches for neck   X                                                                            Specific Interventions Next Treatment: manual therapy to cervical area and jaw, posture education, stretching and strengthening    Activity/Treatment Tolerance:  [x]  Patient tolerated treatment well  []  Patient limited by fatigue  []  Patient limited by pain   []  Patient limited by medical complications  []  Other:     Assessment: Patient with significant tightness noted today both sides of jaw along with increased pain. Patient still using rubber bands at home to help realign her jaw. PT may need to talk to both orthodontist and surgeon regarding patient's ongoing pain leading to continued tightness in her jaw and in her neck. Muscles may also be being affected due to doctors still working on realigning her jaw and so TMJ cannot work properly while jaw still being aligned. Will continue to work on tightness throughout and trying to get normal TMJ motion with limited popping. GOALS:  Patient Goal: To get muscles more relaxed and see if helps with her pain.     Short Term Goals:  Time Frame: 4 weeks  1) Patient to report 25-50% decrease in jaw pain for ease with talking and singing  2) Patient to demonstrate even TMJ motion with full jaw opening to decrease stress on joints when eating  3) Patient to be fully educated on stretching and strengthening for mobility and stability  4) Patient to demonstrate 50-75% decrease in neck tightness for decreased stress on her joints. 5) Patient to report able to eat chewier foods without pain in her jaw      Long Term Goals:  Time Frame: 12 weeks  1) Patient to be independent with home program to perform all eating and talking with minimal to no pain and popping in jaw       Patient Education:   []  HEP/Education Completed: Continue with manual work and stretches.  [employer] Access Code:  [x]  No new Education completed  []  Reviewed Prior HEP      [x]  Patient verbalized and/or demonstrated understanding of education provided. []  Patient unable to verbalize and/or demonstrate understanding of education provided. Will continue education. []  Barriers to learning: None    PLAN:  []  Plan of care initiated. Plan to see patient 1 times per week for 12 weeks to address the treatment planned outlined above.   [x]  Continue with current plan of care  []  Modify plan of care as follows:    []  Hold pending physician visit  []  Discharge    Time In 0918   Time Out 1003   Timed Code Minutes: 45 min   Total Treatment Time: 45 min       Electronically Signed by: [place] [id number]

## 2022-06-14 ENCOUNTER — HOSPITAL ENCOUNTER (OUTPATIENT)
Dept: PHYSICAL THERAPY | Age: 22
Setting detail: THERAPIES SERIES
End: 2022-06-14
Payer: COMMERCIAL

## 2022-06-20 ENCOUNTER — HOSPITAL ENCOUNTER (OUTPATIENT)
Dept: PHYSICAL THERAPY | Age: 22
Setting detail: THERAPIES SERIES
Discharge: HOME | End: 2022-06-20
Payer: COMMERCIAL

## 2022-06-20 PROCEDURE — 97110 THERAPEUTIC EXERCISES: CPT

## 2022-06-20 PROCEDURE — 97140 MANUAL THERAPY 1/> REGIONS: CPT

## 2022-06-20 NOTE — PROGRESS NOTES
[facility]  [] EVALUATION  [x] DAILY NOTE (LAND) [] DAILY NOTE (AQUATIC ) [] PROGRESS NOTE [] DISCHARGE NOTE    [] [address] [facility]   [] [address]    [x] [facility]   [] aCndy Howard    Date: 2022  Patient Name:  Pavel Luciano  : 2000  MRN: [id number]  CSN: [id number]    Referring Practitioner BRENDA Pat -*   Diagnosis Other specified disorders of temporomandibular joint [M26.69]    Treatment Diagnosis Jaw pain   Date of Evaluation 22    Additional Pertinent History Jaw surgery one year ago      Functional Outcome Measure Used TMD   Functional Outcome Score 16/40 for 40% disability (22)       Insurance: Primary: Payor: Aniya Mcbride /  /  / ,   Secondary: [facility]   Authorization Information: Allowed 30 visits    Visit # 4, 4/10 for progress note   Visits Allowed: 30 hard limit   Recertification Date:    Physician Follow-Up: None with surgeon. Follow up with orthodontist next week. Physician Orders:    History of Present Illness:      SUBJECTIVE: Patient reports has been trying to use her retainer and her rubber bands and sometimes they help and she feels better and other times they hurt and she will have increased pain and have to stop using them. States saw the chiropractor last week and he uses a tool that helped to open things up a little but did not change the pain a lot - instructed to go to massage therapist. States the pain in her jaw never goes away. Reports sees orthodontist next week and will talk to her about situation.     TREATMENT   Precautions: Be careful eating chewy foods   Pain: 5/10 in jaw    X in shaded column indicates activity completed today   Modalities Parameters/  Location  Notes                     Manual Therapy Time/Technique  Notes   Soft tissue and TP work to internal jaw musculature to bilat sides.Myofascial work to bilateral sides neck. X Significant tightness noted right side neck along SCM and levator but less tightness noted right side internally today. More tightness noted internally left side of jaw today. Mobilization for distraction to right and the  lateral motion on right side then also added in lateral mobilization to left side. 10 reps each time - did more lateral reps to left side X Patient with \"s\" curve to different sides with opening and closing. Less popping noted in loudness and overall after lateral mobilization to left side but did not change the curving. Exercise/Intervention   Notes   Educated on doing manual therapy inside mouth and doing rot/SB/Levator stretches for neck   X                                                                            Specific Interventions Next Treatment: manual therapy to cervical area and jaw, posture education, stretching and strengthening    Activity/Treatment Tolerance:  [x]  Patient tolerated treatment well  []  Patient limited by fatigue  []  Patient limited by pain   []  Patient limited by medical complications  []  Other:     Assessment: Patient with continued significant tightness to right side cervical musculature and today internal left side of jaw was tighter than right. Patient with very loud popping when started therapy but significantly improved with manual therapy to left side of jaw. Patient with \"s\" curve that changes directions with opening and closing and PT not sure if affected by the increased tightness left side jaw today. Will see what orthodontist has to say next week about situation and will continue to work on tightness and jaw motion as can tolerate. GOALS:  Patient Goal: To get muscles more relaxed and see if helps with her pain.     Short Term Goals:  Time Frame: 4 weeks  1) Patient to report 25-50% decrease in jaw pain for ease with talking and singing  2) Patient to demonstrate even TMJ motion with full

## 2022-07-01 ENCOUNTER — APPOINTMENT (OUTPATIENT)
Dept: PHYSICAL THERAPY | Age: 22
End: 2022-07-01
Payer: COMMERCIAL

## 2022-07-05 ENCOUNTER — HOSPITAL ENCOUNTER (OUTPATIENT)
Dept: PHYSICAL THERAPY | Age: 22
Setting detail: THERAPIES SERIES
Discharge: HOME | End: 2022-07-05
Payer: COMMERCIAL

## 2022-07-05 PROCEDURE — 97140 MANUAL THERAPY 1/> REGIONS: CPT

## 2022-07-05 NOTE — PROGRESS NOTES
[facility]  PHYSICAL THERAPY  [] EVALUATION  [] DAILY NOTE (LAND) [] DAILY NOTE (AQUATIC ) [x] PROGRESS NOTE [] DISCHARGE NOTE    [] [facility]   [] [address]    [x] [facility]   [] Janie Batista    Date: 2022  Patient Name:  Pallavi Dunn  : 2000  MRN: [id number]  CSN: [id number]    Referring Practitioner BRENDA Cross -*   Diagnosis Other specified disorders of temporomandibular joint [M26.69]    Treatment Diagnosis Jaw pain   Date of Evaluation 22    Additional Pertinent History Jaw surgery one year ago      Functional Outcome Measure Used TMD   Functional Outcome Score 16/40 for 40% disability (22)       Insurance: Primary: Payor: Naina Irvin /  /  / ,   Secondary: [facility]   Authorization Information: Allowed 30 visits    Visit # 4, 0/10 for progress note   Visits Allowed: 30 hard limit   Recertification Date:    Physician Follow-Up: None with surgeon. Follow up with orthodontist on 22   Physician Orders:    History of Present Illness:      SUBJECTIVE: Patient reports the last two weeks her jaw has been really painful at 9/10, to the point of causing her to cry. States this last week has not been as bad as the week before.  saw the orthodontist and they said it would be ok to try some dry needling and also they are having her see a doctor in [facility] to help her manage her pain.  feels therapy helps but she also can feel worse after treatments.  goes back to the orthodontist next week to get new Jocelyn Nelson and has new rubber bands to try.  does not want to do too much with therapy today so as to not make her pain worse.     TREATMENT   Precautions: Be careful eating chewy foods   Pain: 7/10 in jaw    X in shaded column indicates activity completed today   Modalities Parameters/  Location  Notes                     Manual Therapy Time/Technique  Notes   Manual tissue work to bilateral sides of neck from jaw attachment to base of neck. Also work on masseter and temporalis musculature. 38 minutes X Significant tightness noted right side neck along SCM and levator. Mobilization for distraction to right and the  lateral motion on right side then also added in lateral mobilization to left side. 10 reps each time - did more lateral reps to left side  Patient with \"s\" curve to different sides with opening and closing. Less popping noted in loudness and overall after lateral mobilization to left side but did not change the curving. Try dry needling next session      Exercise/Intervention   Notes   Educated on doing manual therapy inside mouth and doing rot/SB/Levator stretches for neck                                                                               Specific Interventions Next Treatment: manual therapy to cervical area and jaw, posture education, stretching and strengthening    Activity/Treatment Tolerance:  [x]  Patient tolerated treatment well  []  Patient limited by fatigue  []  Patient limited by pain   []  Patient limited by medical complications  []  Other:     Assessment: Held internal manual therapy today along with any mobilization since patient's pain has been so high lately. Patient not getting any long term relief with manual therapy so will trial dry needling next session in conjunction with manual therapy. Will also see when patient getting in to see doctor in [place] and how feels with new equipment from orthodontist. Patient's pain is still the driving force to her stress and tension but no one can appear to figure out exactly where the pain is coming from. PT wonders if need to examine the TMJ to see if the condyle is lined up with the disc during opening and closing. Will continue to utilize therapy services as needed and based on doctor's recommendations.     GOALS:  Patient Goal: To get muscles more relaxed and see if helps with her pain. Short Term Goals:  Time Frame: 4 weeks  1) Patient to report 25-50% decrease in jaw pain for ease with talking and singing  [] Goal Met [x] Goal Not Met [x] Continue Goal [] Discontinue Goal  [] Revise Goal  Goal Assessment: Patient's pain continues to fluctuate  2) Patient to demonstrate even TMJ motion with full jaw opening to decrease stress on joints when eating  [] Goal Met [x] Goal Not Met [x] Continue Goal [] Discontinue Goal  [] Revise Goal  Goal Assessment: Patient's motion can start to normalize with therapy but then returns the following session with uneven motion and popping again. 3) Patient to be fully educated on stretching and strengthening for mobility and stability  [] Goal Met [x] Goal Not Met [x] Continue Goal [] Discontinue Goal  [] Revise Goal  Goal Assessment: PT still educating patient on  4) Patient to demonstrate 50-75% decrease in neck tightness for decreased stress on her joints. [] Goal Met [x] Goal Not Met [x] Continue Goal [] Discontinue Goal  [] Revise Goal  Goal Assessment: Patient still with significant tightness in her neck more to right side  5) Patient to report able to eat chewier foods without pain in her jaw  [] Goal Met [x] Goal Not Met [x] Continue Goal [] Discontinue Goal  [] Revise Goal  Goal Assessment: States still has trouble eating no matter the food      Long Term Goals:  Time Frame: 12 weeks  1) Patient to be independent with home program to perform all eating and talking with minimal to no pain and popping in jaw   [] Goal Met [x] Goal Not Met [x] Continue Goal [] Discontinue Goal  [] Revise Goal  Goal Assessment: Patient still working on HEP and still having about same amount of pain and popping in jaw      Patient Education:   [x]  HEP/Education Completed: Continue with manual work and stretches. Will try dry needling next session.  PT to call doctor to see what else therapy can possibly help with  [facility] Access Code:  []  No new Education completed  []  Reviewed Prior HEP      [x]  Patient verbalized and/or demonstrated understanding of education provided. []  Patient unable to verbalize and/or demonstrate understanding of education provided. Will continue education. []  Barriers to learning: None    PLAN:  []  Plan of care initiated. Plan to see patient 1 times per week for 12 weeks to address the treatment planned outlined above.   [x]  Continue with current plan of care  []  Modify plan of care as follows:    []  Hold pending physician visit  []  Discharge    Time In 1005   Time Out 1050   Timed Code Minutes: 45 min   Total Treatment Time: 45 min       Electronically Signed by: [place] [id number]

## 2022-07-15 ENCOUNTER — HOSPITAL ENCOUNTER (OUTPATIENT)
Dept: PHYSICAL THERAPY | Age: 22
Setting detail: THERAPIES SERIES
Discharge: HOME | End: 2022-07-15
Payer: COMMERCIAL

## 2022-07-15 PROCEDURE — 97140 MANUAL THERAPY 1/> REGIONS: CPT

## 2022-07-15 PROCEDURE — 97110 THERAPEUTIC EXERCISES: CPT

## 2022-07-15 NOTE — PROGRESS NOTES
[address] [facility]  PHYSICAL THERAPY  [] EVALUATION  [x] DAILY NOTE (LAND) [] DAILY NOTE (AQUATIC ) [] PROGRESS NOTE [] DISCHARGE NOTE    [] [facility]   [] [address]    [x] [facility]   [] Alysia Garciastone    Date: 7/15/2022  Patient Name:  Leopoldo Grosser  : 2000  MRN: [id number]  CSN: [id number]    Referring Practitioner BRENDA Elizondo -*   Diagnosis Other specified disorders of temporomandibular joint [M26.69]    Treatment Diagnosis Jaw pain   Date of Evaluation 22    Additional Pertinent History Jaw surgery one year ago      Functional Outcome Measure Used TMD   Functional Outcome Score 16/40 for 40% disability (22)       Insurance: Primary: Payor: Harry Melendez /  /  / ,   Secondary: [facility]   Authorization Information: Allowed 30 visits    Visit # 5, 1/10 for progress note   Visits Allowed: 30 hard limit   Recertification Date: 3-04-35   Physician Follow-Up: None with surgeon. Follow up with orthodontist on 22   Physician Orders:    History of Present Illness:      SUBJECTIVE: Patient reports she is still having a lot of pain in her jaw and it is not going away. 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  was denied getting off campus housing.  also does not think is sleeping well and  is not drinking enough water.  she has had 2 episodes in the last week where she has gotten pain in both her hands along with into her elbows and up her arms and she has been dropping things. Reports then it goes away and she is fine. Reports is not sure what it is due to.  has not heard back from her surgeon and cannot see the pain management specialist due to they do not take her insurance.   saw the orthodontist this week and was just told about the same things and was given looser bands to wear. Reports is very frustrated with everything. TREATMENT   Precautions: Be careful eating chewy foods   Pain: 6/10 in jaw/neck    X in shaded column indicates activity completed today   Modalities Parameters/  Location  Notes                     Manual Therapy Time/Technique  Notes   Manual tissue work to bilateral sides of neck from jaw attachment to base of neck. Also work on masseter and temporalis musculature. 10 minutes X Significant tightness noted right side neck along SCM and levator along with more tension on eft side neck today. Mobilization for distraction to right and the  lateral motion on right side then also added in lateral mobilization to left side. 10 reps each time - did more lateral reps to left side  Patient with \"s\" curve to different sides with opening and closing. Less popping noted in loudness and overall after lateral mobilization to left side but did not change the curving. Try dry needling next session      Exercise/Intervention   Notes   Educated on doing manual therapy inside mouth and doing rot/SB/Levator stretches for neck       Spent a good amount of session discussing patient's current issues that are causing her stress and trying to educate her on what to do to help, what to look at doing next and what to talk to doctors about. X                                                                     Specific Interventions Next Treatment: manual therapy to cervical area and jaw, posture education, stretching and strengthening    Activity/Treatment Tolerance:  [x]  Patient tolerated treatment well  []  Patient limited by fatigue  []  Patient limited by pain   []  Patient limited by medical complications  []  Other:     Assessment: Patient is still having high levels of pain and tension throughout her neck and jaw and also dealing with high levels of anxiety and stress that continues to maintain her high levels of tension and pain.  Patient also is not sleeping well and not drinking enough water. PT feels her recent arm and hand symptoms are possibly stemming from her stress and anxiety levels but could be something cervical or rheumatological. PT did not attempt dry needling today due to feels patient has too much going on right now that is going to keep her in high levels of stress and tension and the needling may not be able to help at this time until she can get her stress under control. PT also feels someone needs to help patient with her pain level ans then she will be able to relax and sleep better. PT is putting patient on hold and will call her doctors to figure out what to do next. GOALS:  Patient Goal: To get muscles more relaxed and see if helps with her pain. Short Term Goals:  Time Frame: 4 weeks  1) Patient to report 25-50% decrease in jaw pain for ease with talking and singing  2) Patient to demonstrate even TMJ motion with full jaw opening to decrease stress on joints when eating  3) Patient to be fully educated on stretching and strengthening for mobility and stability  4) Patient to demonstrate 50-75% decrease in neck tightness for decreased stress on her joints. 5) Patient to report able to eat chewier foods without pain in her jaw      Long Term Goals:  Time Frame: 12 weeks  1) Patient to be independent with home program to perform all eating and talking with minimal to no pain and popping in jaw       Patient Education:   [x]  HEP/Education Completed: Continue with manual work and stretches. PT to call doctors to see what else therapy can possibly help with  [facility] Access Code:  []  No new Education completed  []  Reviewed Prior HEP      [x]  Patient verbalized and/or demonstrated understanding of education provided. []  Patient unable to verbalize and/or demonstrate understanding of education provided. Will continue education. []  Barriers to learning: None    PLAN:  []  Plan of care initiated.   Plan to see patient 1 times per week for 12 weeks to address the treatment planned outlined above.   [x]  Continue with current plan of care  []  Modify plan of care as follows:    []  Hold pending physician visit  []  Discharge    Time In 0915   Time Out 1000   Timed Code Minutes: 45 min   Total Treatment Time: 45 min       Electronically Signed by: [place] [id number]

## 2022-07-18 ENCOUNTER — TELEPHONE (OUTPATIENT)
Dept: FAMILY MEDICINE CLINIC | Age: 22
End: 2022-07-18

## 2022-07-18 NOTE — TELEPHONE ENCOUNTER
Jaylen Beltran from PT is calling stating she is seeing PT for TMJ post op from surgery she is still having a lot of pain. Patient is experiencing a great deal of stress and anxiety, PT thinks some of this is pain is anxiety related. Patients surgeon also is suggesting new onset of symptoms possibly related to a great deal of stress/anxiety. She has an appointment on Wednesday.  This is just an [place]

## 2022-07-20 ENCOUNTER — OFFICE VISIT (OUTPATIENT)
Dept: FAMILY MEDICINE CLINIC | Age: 22
End: 2022-07-20
Payer: COMMERCIAL

## 2022-07-20 VITALS
BODY MASS INDEX: 17.42 KG/M2 | WEIGHT: 102 LBS | RESPIRATION RATE: 16 BRPM | HEIGHT: 64 IN | HEART RATE: 82 BPM | DIASTOLIC BLOOD PRESSURE: 68 MMHG | SYSTOLIC BLOOD PRESSURE: 102 MMHG | OXYGEN SATURATION: 98 %

## 2022-07-20 DIAGNOSIS — Z23 NEED FOR HPV VACCINE: ICD-10-CM

## 2022-07-20 DIAGNOSIS — R68.84 JAW PAIN: ICD-10-CM

## 2022-07-20 DIAGNOSIS — F41.9 ANXIETY: Primary | ICD-10-CM

## 2022-07-20 DIAGNOSIS — S03.00XD DISLOCATION OF TEMPOROMANDIBULAR JOINT, SUBSEQUENT ENCOUNTER: ICD-10-CM

## 2022-07-20 PROCEDURE — 99214 OFFICE O/P EST MOD 30 MIN: CPT | Performed by: NURSE PRACTITIONER

## 2022-07-20 PROCEDURE — G8427 DOCREV CUR MEDS BY ELIG CLIN: HCPCS | Performed by: NURSE PRACTITIONER

## 2022-07-20 PROCEDURE — 90471 IMMUNIZATION ADMIN: CPT | Performed by: NURSE PRACTITIONER

## 2022-07-20 PROCEDURE — 90651 9VHPV VACCINE 2/3 DOSE IM: CPT | Performed by: NURSE PRACTITIONER

## 2022-07-20 PROCEDURE — G8419 CALC BMI OUT NRM PARAM NOF/U: HCPCS | Performed by: NURSE PRACTITIONER

## 2022-07-20 PROCEDURE — 1036F TOBACCO NON-USER: CPT | Performed by: NURSE PRACTITIONER

## 2022-07-20 RX ORDER — BUSPIRONE HYDROCHLORIDE 5 MG/1
5 TABLET ORAL 2 TIMES DAILY
Qty: 60 TABLET | Refills: 1 | Status: SHIPPED | OUTPATIENT
Start: 2022-07-20 | End: 2022-09-28

## 2022-07-20 RX ORDER — DEXAMETHASONE 4 MG/1
1 TABLET ORAL 2 TIMES DAILY
COMMUNITY
Start: 2022-07-07

## 2022-07-20 NOTE — PROGRESS NOTES
Colton Yepez [address] [place] [id number]  Dept: [phone]  Loc: Terrence Dos Santos (:  2000) is a 25 y.o. female,Established patient, here for evaluation of the following chief complaint(s):  Hand Pain (Bilateral ) and Other (Emotional Support animal )      ASSESSMENT/PLAN:  1. Anxiety  Patient with long hx of anxiety. Is agreeable to start buspar. F/u in 4 weeks. -     busPIRone (BUSPAR) 5 MG tablet; Take 1 tablet by mouth in the morning and 1 tablet before bedtime. , Disp-60 tablet, R-1Normal  2. Dislocation of temporomandibular joint, subsequent encounter  3. Jaw pain  Follows with specialty and PT  4. Need for HPV vaccine  -     HPV, GARDASIL 9, (age 10-36 yrs), IM    Return in about 4 weeks (around 2022) for Discuss results, anxiety f/u . SUBJECTIVE/OBJECTIVE:  Patient presents with:  Hand Pain: Bilateral   Other: Emotional Support animal     Patient is wanting to live off campus so she can have her own vehicle, for doctors appointments. School is recommending a single room. They can a yellow pass to have her car on campus    Can she get a red parking pass. And single dorm. Increased anxiety    Doing PT for TMJ, wanting to be referred to pain management for neck jaw pain. Is wearing a retainer to move jaw. For a couple days had pain in her fingers and would radiate into wrist.          has a past medical history of Allergy, Anxiety, Mono exposure, and Strep throat. Review of Systems   Constitutional:  Negative for appetite change, chills, fatigue and fever. HENT:  Negative for congestion, ear discharge, sinus pressure, tinnitus and voice change. Bilateral jaw pain from TMJ   Eyes:  Negative for pain, discharge, itching and visual disturbance. Respiratory:  Negative for cough, choking, chest tightness, shortness of breath and wheezing.     Cardiovascular:  Negative for chest pain, palpitations and leg swelling. Gastrointestinal:  Negative for abdominal distention, abdominal pain, constipation, nausea and vomiting. Endocrine: Negative for cold intolerance and heat intolerance. Genitourinary:  Negative for dysuria, flank pain, hematuria, vaginal discharge and vaginal pain. Musculoskeletal:  Negative for arthralgias, back pain, gait problem, neck pain and neck stiffness. Skin:  Negative for color change and rash. Neurological:  Negative for dizziness, syncope, speech difficulty, light-headedness, numbness and headaches. Psychiatric/Behavioral:  Negative for behavioral problems, confusion, self-injury and suicidal ideas. The patient is nervous/anxious. Physical Exam  Vitals and nursing note reviewed. Constitutional:       General: She is not in acute distress. Appearance: Normal appearance. She is well-developed. She is not diaphoretic. HENT:      Head: Normocephalic and atraumatic. Comments: Bilateral pain in TMJ with wide opening of mouth      Right Ear: Tympanic membrane and external ear normal. Tympanic membrane is not injected or erythematous. Left Ear: Tympanic membrane and external ear normal. Tympanic membrane is not injected or erythematous. Nose: Nose normal.      Mouth/Throat:      Mouth: Mucous membranes are moist.      Pharynx: Oropharynx is clear. Uvula midline. Eyes:      General:         Right eye: No discharge. Left eye: No discharge. Conjunctiva/sclera: Conjunctivae normal.      Pupils: Pupils are equal, round, and reactive to light. Neck:      Thyroid: No thyromegaly. Trachea: Trachea normal.   Cardiovascular:      Rate and Rhythm: Normal rate and regular rhythm. Pulses: Normal pulses. Heart sounds: Normal heart sounds, S1 normal and S2 normal. No murmur heard. No friction rub. No gallop. Pulmonary:      Effort: Pulmonary effort is normal. No respiratory distress.       Breath sounds: Normal breath sounds. No wheezing or rales. Chest:      Chest wall: No tenderness. Abdominal:      General: Bowel sounds are normal. There is no distension. Palpations: Abdomen is soft. There is no mass. Tenderness: There is no abdominal tenderness. There is no guarding or rebound. Musculoskeletal:         General: No tenderness or deformity. Normal range of motion. Cervical back: Full passive range of motion without pain, normal range of motion and neck supple. Lymphadenopathy:      Cervical: No cervical adenopathy. Skin:     General: Skin is warm and dry. Capillary Refill: Capillary refill takes less than 2 seconds. Neurological:      Mental Status: She is alert and oriented to person, place, and time. Deep Tendon Reflexes: Reflexes are normal and symmetric. Psychiatric:         Attention and Perception: Attention and perception normal.         Mood and Affect: Mood normal. Affect is tearful. Speech: Speech normal.         Behavior: Behavior normal. Behavior is cooperative. Thought Content: Thought content normal. Thought content does not include homicidal or suicidal ideation. Thought content does not include homicidal or suicidal plan. Cognition and Memory: Cognition and memory normal.           An electronic signature was used to authenticate this note.     --Opal Leone, BRENDA - CNP

## 2022-07-20 NOTE — PROGRESS NOTES
After obtaining consent, and per orders of Mitzi Patel CNP, injection of HPV given in Right deltoid by Viet Askew MA. Patient instructed to remain in clinic for 20 minutes afterwards, and to report any adverse reaction to me immediately.     Immunizations Administered       Name Date Dose Route    HPV 9-valent [facility]) 7/20/2022 0.5 mL Intramuscular    Site: Deltoid- Right    Lot: [id number]    NDC: 7179-6828-56        Vaccine checklist completed- patient tolerated injection well

## 2022-07-20 NOTE — LETTER
[address] [place], [place], [address]  [address]   Fax [phone]    7/20/2022    To Whom it May Concern,    Samy Francois is a patient of mine, and was seen in office today. It is my professional opinion that due to her chronic health conditions,   She be granted permission  to live off campus or on campus in a single dorm room with a Red parking pass, to allow her easier access to her vehicle for the multiple   appointments she will have off campus weekly. If you have any questions or concerns please do not hesitate to call or email,    Thank you     [email or web address]

## 2022-07-25 ASSESSMENT — ENCOUNTER SYMPTOMS
COUGH: 0
ABDOMINAL DISTENTION: 0
CONSTIPATION: 0
EYE PAIN: 0
VOMITING: 0
BACK PAIN: 0
SINUS PRESSURE: 0
CHEST TIGHTNESS: 0
EYE ITCHING: 0
EYE DISCHARGE: 0
VOICE CHANGE: 0
ABDOMINAL PAIN: 0
COLOR CHANGE: 0
WHEEZING: 0
SHORTNESS OF BREATH: 0
NAUSEA: 0
CHOKING: 0

## 2022-08-10 ENCOUNTER — HOSPITAL ENCOUNTER (OUTPATIENT)
Dept: PHYSICAL THERAPY | Age: 22
Setting detail: THERAPIES SERIES
Discharge: HOME | End: 2022-08-10
Payer: COMMERCIAL

## 2022-08-10 PROCEDURE — 97110 THERAPEUTIC EXERCISES: CPT

## 2022-08-10 NOTE — DISCHARGE SUMMARY
[address]  PHYSICAL THERAPY  [] EVALUATION  [] DAILY NOTE (LAND) [] DAILY NOTE (AQUATIC ) [] PROGRESS NOTE [x] DISCHARGE NOTE    [] 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 Follow up with orthodontist on 8-15-22   Physician Orders:    History of Present Illness:      SUBJECTIVE: Patient reports she is still having pain that is not going away. States she has a popping in her jaw and feels like her jaw catches and gets stuck and she has to get it \"unstuck\" so she can talk and eat.  has not had much luck with trying to see someone for pain management because she has either seen that doctor in the past and did not have a good experience or they will not take her insurance. States she tries to do her HEP when she can but it depends on how much pain she is in that day. States she goes back to school on the  and would like to find someone down there to follow with and might look into someone in [place]. [facility] also is looking in medical massage and/or acupuncture. States agreeable to being with therapy right now since will be away at college. TREATMENT   Precautions: Be careful eating chewy foods   Pain: 6/10 in jaw/neck    X in shaded column indicates activity completed today   Modalities Parameters/  Location  Notes                     Manual Therapy Time/Technique  Notes   Manual tissue work to bilateral sides of neck from jaw attachment to base of neck. Also work on masseter and temporalis musculature. 10 minutes  Significant tightness noted right side neck along SCM and levator along with more tension on eft side neck today. Mobilization for distraction to right and the  lateral motion on right side then also added in lateral mobilization to left side. 10 reps each time - did more lateral reps to left side  Patient with \"s\" curve to different sides with opening and closing. Less popping noted in loudness and overall after lateral mobilization to left side but did not change the curving. Try dry needling next session      Exercise/Intervention   Notes   Educated on doing manual therapy inside mouth and doing rot/SB/Levator stretches for neck       Spent a good amount of session discussing patient's current issues/pain, treatment options can look into when back at college and what she needs to talk to her doctors about. Patient had a lot of questions so PT tried to answer what she could. X Patient needs to ask her orthodontist if they know how much longer will be until jaw is in alignment because can affect the tightness around her jaw as it continues to be adjusted/aligned. Specific Interventions Next Treatment: No further treatments    Activity/Treatment Tolerance:  [x]  Patient tolerated treatment well  []  Patient limited by fatigue  []  Patient limited by pain   []  Patient limited by medical complications  []  Other:     Assessment: Patient has made little progress with therapy and is not meeting goals for pain and tightness.  She still has high levels of Patient to be fully educated on stretching and strengthening for mobility and stability  [x] Goal Met [] Goal Not Met [] Continue Goal [x] Discontinue Goal  [] Revise Goal  4) Patient to demonstrate 50-75% decrease in neck tightness for decreased stress on her joints. [] Goal Met [x] Goal Not Met [] Continue Goal [x] Discontinue Goal  [] Revise Goal  Goal Assessment: Patient reports tension changes from day to day and overall is not a lot better  5) Patient to report able to eat chewier foods without pain in her jaw  [] Goal Met [x] Goal Not Met [] Continue Goal [x] Discontinue Goal  [] Revise Goal  Goal Assessment: Patient reports pain with chewing and changes from day to day    Long Term Goals:  Time Frame: 12 weeks  1) Patient to be independent with home program to perform all eating and talking with minimal to no pain and popping in jaw   [] Goal Met [x] Goal Not Met [] Continue Goal [x] Discontinue Goal  [] Revise Goal  Goal Assessment: Patient has a HEP but still having pain and popping in her jaw      Patient Education:   [x]  HEP/Education Completed: Continue with manual work and stretches. PT to call doctors to see what else therapy can possibly help with  Medbridge Access Code:  []  No new Education completed  [x]  Reviewed Prior HEP      [x]  Patient verbalized and/or demonstrated understanding of education provided. []  Patient unable to verbalize and/or demonstrate understanding of education provided. Will continue education. []  Barriers to learning: None    PLAN:  []  Plan of care initiated. Plan to see patient 1 times per week for 12 weeks to address the treatment planned outlined above.   []  Continue with current plan of care  []  Modify plan of care as follows:    []  Hold pending physician visit  [x]  Discharge    Time In 1035   Time Out 1120   Timed Code Minutes: 45 min   Total Treatment Time: 45 min       Electronically Signed by: [place] [id number]

## 2022-08-31 ENCOUNTER — PATIENT MESSAGE (OUTPATIENT)
Dept: FAMILY MEDICINE CLINIC | Age: 22
End: 2022-08-31

## 2022-08-31 DIAGNOSIS — Z86.2 HISTORY OF ANEMIA: Primary | ICD-10-CM

## 2022-08-31 NOTE — TELEPHONE ENCOUNTER
From: Valente Rogers  To: Jessica Weeks  Sent: 8/31/2022 3:28 PM EDT  Subject: Dizzy    Hi Selena  I noticed during my period last month an this month - currently, I have gotten very dizzy an light headed. Should I get my iron checked? An what can I do for it?

## 2022-09-28 DIAGNOSIS — F41.9 ANXIETY: ICD-10-CM

## 2022-09-28 RX ORDER — BUSPIRONE HYDROCHLORIDE 5 MG/1
TABLET ORAL
Qty: 60 TABLET | Refills: 3 | Status: SHIPPED | OUTPATIENT
Start: 2022-09-28

## 2023-01-03 ENCOUNTER — OFFICE VISIT (OUTPATIENT)
Dept: FAMILY MEDICINE CLINIC | Age: 23
End: 2023-01-03
Payer: COMMERCIAL

## 2023-01-03 VITALS
HEART RATE: 74 BPM | DIASTOLIC BLOOD PRESSURE: 72 MMHG | HEIGHT: 64 IN | SYSTOLIC BLOOD PRESSURE: 118 MMHG | BODY MASS INDEX: 18.1 KG/M2 | RESPIRATION RATE: 16 BRPM | WEIGHT: 106 LBS | OXYGEN SATURATION: 98 %

## 2023-01-03 DIAGNOSIS — T81.49XA INCISIONAL INFECTION: ICD-10-CM

## 2023-01-03 DIAGNOSIS — Z48.02 ENCOUNTER FOR REMOVAL OF SUTURES: ICD-10-CM

## 2023-01-03 DIAGNOSIS — J45.40 MODERATE PERSISTENT ASTHMA WITHOUT COMPLICATION: Primary | ICD-10-CM

## 2023-01-03 PROCEDURE — 1036F TOBACCO NON-USER: CPT | Performed by: NURSE PRACTITIONER

## 2023-01-03 PROCEDURE — G8427 DOCREV CUR MEDS BY ELIG CLIN: HCPCS | Performed by: NURSE PRACTITIONER

## 2023-01-03 PROCEDURE — 99214 OFFICE O/P EST MOD 30 MIN: CPT | Performed by: NURSE PRACTITIONER

## 2023-01-03 PROCEDURE — G8419 CALC BMI OUT NRM PARAM NOF/U: HCPCS | Performed by: NURSE PRACTITIONER

## 2023-01-03 PROCEDURE — G8482 FLU IMMUNIZE ORDER/ADMIN: HCPCS | Performed by: NURSE PRACTITIONER

## 2023-01-03 RX ORDER — FLUTICASONE PROPIONATE 110 UG/1
2 AEROSOL, METERED RESPIRATORY (INHALATION) 2 TIMES DAILY
Qty: 12 G | Refills: 3 | Status: SHIPPED | OUTPATIENT
Start: 2023-01-03 | End: 2024-01-03

## 2023-01-03 RX ORDER — CEPHALEXIN 500 MG/1
500 CAPSULE ORAL 2 TIMES DAILY
Qty: 20 CAPSULE | Refills: 0 | Status: SHIPPED | OUTPATIENT
Start: 2023-01-03 | End: 2023-01-13

## 2023-01-03 ASSESSMENT — ENCOUNTER SYMPTOMS
COLOR CHANGE: 0
CHOKING: 0
EYE ITCHING: 0
BACK PAIN: 0
ABDOMINAL PAIN: 0
VOMITING: 0
SHORTNESS OF BREATH: 0
WHEEZING: 0
EYE PAIN: 0
COUGH: 0
SINUS PRESSURE: 0
VOICE CHANGE: 0
NAUSEA: 0
EYE DISCHARGE: 0
ABDOMINAL DISTENTION: 0
CHEST TIGHTNESS: 0
CONSTIPATION: 0

## 2023-01-03 ASSESSMENT — PATIENT HEALTH QUESTIONNAIRE - PHQ9
SUM OF ALL RESPONSES TO PHQ QUESTIONS 1-9: 0
2. FEELING DOWN, DEPRESSED OR HOPELESS: 0
1. LITTLE INTEREST OR PLEASURE IN DOING THINGS: 0
SUM OF ALL RESPONSES TO PHQ QUESTIONS 1-9: 0
SUM OF ALL RESPONSES TO PHQ9 QUESTIONS 1 & 2: 0

## 2023-01-03 NOTE — PROGRESS NOTES
[address] [address]  Dept: [phone]  Dept Fax: [phone]  Loc: [phone]      Pankaj Persaud is a 23 y.o. female who presents todayfor Other (Pt states that she has a mole removed 2 weeks ago and within the past 2 days there has been some brown/green discharge )      HPI:      He to have sutures removed, Supposed to have sutures removed yesterday, dermatologist removed a lesion. Having some yellow drainage from incision site, that started 4 days ago, more tender in the last few days also. Using Flovent 1 puff BID    Rescue inhaler 1-2 times a month       The patient is allergic to singulair [montelukast] and bactrim [sulfamethoxazole-trimethoprim]. Past MedicalHistory  Jacinta  has a past medical history of Allergy, Anxiety, Mono exposure, and Strep throat.     Medications    Current Outpatient Medications:     fluticasone (FLOVENT HFA) 110 MCG/ACT inhaler, Inhale 2 puffs into the lungs 2 times daily, Disp: 12 g, Rfl: 3    cephALEXin (KEFLEX) 500 MG capsule, Take 1 capsule by mouth 2 times daily for 10 days, Disp: 20 capsule, Rfl: 0    busPIRone (BUSPAR) 5 MG tablet, TAKE ONE TABLET BY MOUTH EVERY MORNING AND TAKE ONE TABLET BY MOUTH AT BEDTIME, Disp: 60 tablet, Rfl: 3    levocetirizine (XYZAL) 5 MG tablet, Take 1 tablet by mouth nightly, Disp: 90 tablet, Rfl: 3    albuterol sulfate (PROAIR RESPICLICK) 537 (90 Base) MCG/ACT aerosol powder inhalation, Inhale into the lungs every 4 hours as needed for Wheezing or Shortness of Breath, Disp: , Rfl:     Levonorgest-Eth Estrad-Fe Bisg (BALCOLTRA) 0.1-20 MG-MCG(21) TABS, , Disp: , Rfl:     azelastine (ASTELIN) 0.1 % nasal spray, 1 spray by Nasal route 2 times daily , Disp: , Rfl:     fluticasone (FLONASE) 50 MCG/ACT nasal spray, SPRAY TWO SPRAYS IN EACH NOSTRIL DAILY (Patient taking differently: 1 spray by Nasal route 2 times daily), Disp: 1 Bottle, Rfl: 11 ferrous sulfate 325 (65 Fe) MG tablet, Take 1 tablet by mouth daily (with breakfast), Disp: 90 tablet, Rfl: 1    ibuprofen (ADVIL;MOTRIN) 200 MG tablet, Take 200 mg by mouth every 6 hours as needed for Pain, Disp: , Rfl:     famotidine (PEPCID) 20 MG tablet, TAKE ONE TABLET BY MOUTH DAILY, Disp: 30 tablet, Rfl: 11    Subjective:      Review of Systems   Constitutional:  Negative for appetite change, chills, fatigue and fever. HENT:  Negative for congestion, ear discharge, sinus pressure, tinnitus and voice change. Eyes:  Negative for pain, discharge, itching and visual disturbance. Respiratory:  Negative for cough, choking, chest tightness, shortness of breath and wheezing. Getting winded easier. Cardiovascular:  Negative for chest pain, palpitations and leg swelling. Gastrointestinal:  Negative for abdominal distention, abdominal pain, constipation, nausea and vomiting. Endocrine: Negative for cold intolerance and heat intolerance. Genitourinary:  Negative for dysuria, hematuria, vaginal discharge and vaginal pain. Musculoskeletal:  Negative for arthralgias, back pain, gait problem, neck pain and neck stiffness. Skin:  Positive for wound. Negative for color change and rash. Neurological:  Negative for dizziness, syncope, speech difficulty, light-headedness, numbness and headaches. Psychiatric/Behavioral:  Negative for behavioral problems, confusion, self-injury and suicidal ideas. The patient is not nervous/anxious. Objective:        Vitals:    01/03/23 1428   BP: 118/72   Site: Left Upper Arm   Position: Sitting   Cuff Size: Medium Adult   Pulse: 74   Resp: 16   SpO2: 98%   Weight: 106 lb (48.1 kg)   Height: 5' 4\" (1.626 m)      Physical Exam  Vitals and nursing note reviewed. Constitutional:       General: She is not in acute distress. Appearance: Normal appearance. She is well-developed. She is not diaphoretic. HENT:      Head: Normocephalic and atraumatic. Right Ear: Tympanic membrane and external ear normal. Tympanic membrane is not injected or erythematous. Left Ear: Tympanic membrane and external ear normal. Tympanic membrane is not injected or erythematous. Nose: Nose normal.      Mouth/Throat:      Pharynx: Uvula midline. Eyes:      General:         Right eye: No discharge. Left eye: No discharge. Conjunctiva/sclera: Conjunctivae normal.      Pupils: Pupils are equal, round, and reactive to light. Neck:      Thyroid: No thyromegaly. Trachea: Trachea normal.   Cardiovascular:      Rate and Rhythm: Normal rate and regular rhythm. Pulses: Normal pulses. Heart sounds: Normal heart sounds, S1 normal and S2 normal. No murmur heard. No friction rub. No gallop. Pulmonary:      Effort: Pulmonary effort is normal. No respiratory distress. Breath sounds: Normal breath sounds. No wheezing or rales. Chest:      Chest wall: No tenderness. Abdominal:      General: Bowel sounds are normal. There is no distension. Palpations: Abdomen is soft. There is no mass. Tenderness: There is no abdominal tenderness. There is no guarding or rebound. Musculoskeletal:         General: No tenderness or deformity. Normal range of motion. Cervical back: Full passive range of motion without pain, normal range of motion and neck supple. Lymphadenopathy:      Cervical: No cervical adenopathy. Skin:     General: Skin is warm and dry. Capillary Refill: Capillary refill takes less than 2 seconds. Comments: Incision site to back from mole removal, with 4 sutures noted. Removed without difficulty. Small yellow/green discharge noted to midline of incision and suture entry points. Slight erythema noted. Cleansed incision site with Hibiclens and applied steri strips       Neurological:      Mental Status: She is alert and oriented to person, place, and time.       Deep Tendon Reflexes: Reflexes are normal and symmetric. Assessment/Plan:       Jacinta was seen today for other. Diagnoses and all orders for this visit:    Moderate persistent asthma without complication  -     fluticasone (FLOVENT HFA) 110 MCG/ACT inhaler; Inhale 2 puffs into the lungs 2 times daily    Incisional infection  Appears that incision site is infected. Cleansed and steri strips applied after sutures removed. Starting on Keflex  F/u on Friday for re check  -     cephALEXin (KEFLEX) 500 MG capsule; Take 1 capsule by mouth 2 times daily for 10 days    Encounter for removal of sutures  See above     Health maintenance labs and vaccines declined at today's visit. Return in about 3 days (around 1/6/2023) for f/u wound check . Patient instructions given and reviewed.     Electronicallysigned by BRENDA Sandhu CNP on 1/9/2023 at 4:20 PM

## 2023-01-04 RX ORDER — FAMOTIDINE 20 MG/1
TABLET, FILM COATED ORAL
Qty: 30 TABLET | Refills: 11 | Status: SHIPPED | OUTPATIENT
Start: 2023-01-04

## 2023-01-06 ENCOUNTER — OFFICE VISIT (OUTPATIENT)
Dept: FAMILY MEDICINE CLINIC | Age: 23
End: 2023-01-06
Payer: MEDICAID

## 2023-01-06 VITALS
SYSTOLIC BLOOD PRESSURE: 90 MMHG | RESPIRATION RATE: 16 BRPM | HEART RATE: 72 BPM | OXYGEN SATURATION: 99 % | DIASTOLIC BLOOD PRESSURE: 60 MMHG

## 2023-01-06 DIAGNOSIS — T14.8XXA WOUND INFECTION: Primary | ICD-10-CM

## 2023-01-06 DIAGNOSIS — Z23 FLU VACCINE NEED: ICD-10-CM

## 2023-01-06 DIAGNOSIS — L08.9 WOUND INFECTION: Primary | ICD-10-CM

## 2023-01-06 PROCEDURE — G8419 CALC BMI OUT NRM PARAM NOF/U: HCPCS | Performed by: FAMILY MEDICINE

## 2023-01-06 PROCEDURE — G8427 DOCREV CUR MEDS BY ELIG CLIN: HCPCS | Performed by: FAMILY MEDICINE

## 2023-01-06 PROCEDURE — G8482 FLU IMMUNIZE ORDER/ADMIN: HCPCS | Performed by: FAMILY MEDICINE

## 2023-01-06 PROCEDURE — 99213 OFFICE O/P EST LOW 20 MIN: CPT | Performed by: FAMILY MEDICINE

## 2023-01-06 PROCEDURE — 1036F TOBACCO NON-USER: CPT | Performed by: FAMILY MEDICINE

## 2023-01-06 PROCEDURE — 90471 IMMUNIZATION ADMIN: CPT | Performed by: FAMILY MEDICINE

## 2023-01-06 PROCEDURE — 90674 CCIIV4 VAC NO PRSV 0.5 ML IM: CPT | Performed by: FAMILY MEDICINE

## 2023-01-06 NOTE — PROGRESS NOTES
After obtaining consent, and per orders of Dr. Lionel Shepard, injection of Flucelvax given in Left deltoid by Mona Osborne MA. Patient instructed to remain in clinic for 20 minutes afterwards, and to report any adverse reaction to me immediately. Immunizations Administered       Name Date Dose Route    Influenza, FLUCELVAX, (age 10 mo+), MDCK, PF, 0.5mL 1/6/2023 0.5 mL Intramuscular    Site: Deltoid- Left    Lot: [id number]    NDC: 05430-213-79            VIS given to provider. Pt tolerated injection well.

## 2023-01-06 NOTE — PROGRESS NOTES
SUBJECTIVE     Jacinta Field is a 23 y. o.female      Pt here for fransisca of wound mid back  Prior derm excision for atypical nevus by Derm in [facility]  Pt seen here earlier this week noted to have increasing redness and drainage- was started on keflex  Sx improving  No drainage  Local wound care measures being used  No fever  No SE with oral atb reported. Review of Systems   Constitutional:  Negative for appetite change, fatigue, fever and unexpected weight change. Genitourinary:  Negative for frequency. Skin:  Negative for rash. Hematological:  Does not bruise/bleed easily. OBJECTIVE     BP 90/60 (Site: Left Upper Arm, Position: Sitting)   Pulse 72   Resp 16   LMP 12/18/2022   SpO2 99%     Physical Exam  Vitals and nursing note reviewed. Constitutional:       General: She is not in acute distress. Appearance: Normal appearance. She is normal weight. She is not ill-appearing. Skin:     General: Skin is warm and dry. Comments: Lesion mid back with 2 steri strips loosely overlying. Noted mild raised erythema at edges in distribution of prior bandage c/w contact dermatitis. Central portion of previously incised area is granulating well without drainage. Neurological:      Mental Status: She is alert. No results found for this visit on 01/06/23. ASSESSMENT       Diagnosis Orders   1. Wound infection        2. Flu vaccine need  Influenza, FLUCELVAX, (age 10 mo+), IM, Preservative Free, 0.5 mL          PLAN     1. Flu vaccine need    - Influenza, FLUCELVAX, (age 10 mo+), IM, Preservative Free, 0.5 mL    2. Wound infection  Resolving with use of keflex. Avoid bandages/adhesives if possible given local contact reaction around incised area. [id number] for topical use of vaseline or aquaphor as lesion heals. Sun protection to avoid excess scarring to area. Follow up with Derm for results of pathology.            Electronically signed by Lilian Carlton MD on 1/6/2023 at 10:29 AM

## 2023-06-30 ENCOUNTER — HOSPITAL ENCOUNTER (EMERGENCY)
Age: 23
Discharge: HOME | End: 2023-06-30
Payer: COMMERCIAL

## 2023-06-30 VITALS
HEIGHT: 64 IN | DIASTOLIC BLOOD PRESSURE: 69 MMHG | WEIGHT: 105 LBS | HEART RATE: 68 BPM | RESPIRATION RATE: 16 BRPM | BODY MASS INDEX: 17.93 KG/M2 | SYSTOLIC BLOOD PRESSURE: 124 MMHG | OXYGEN SATURATION: 100 % | TEMPERATURE: 99.3 F

## 2023-06-30 DIAGNOSIS — R10.84 GENERALIZED ABDOMINAL PAIN: Primary | ICD-10-CM

## 2023-06-30 LAB
BILIRUB UR STRIP.AUTO-MCNC: NEGATIVE MG/DL
CHARACTER UR: CLEAR
COLOR: YELLOW
GLUCOSE UR QL STRIP.AUTO: NEGATIVE MG/DL
HCG UR QL: NEGATIVE
KETONES UR QL STRIP.AUTO: NEGATIVE
NITRITE UR QL STRIP.AUTO: NEGATIVE
PH UR STRIP.AUTO: 6 [PH] (ref 5–9)
PROT UR STRIP.AUTO-MCNC: NEGATIVE MG/DL
RBC #/AREA URNS HPF: NEGATIVE /[HPF]
SP GR UR STRIP.AUTO: 1.01 (ref 1–1.03)
UROBILINOGEN, URINE: 0.2 EU/DL (ref 0.2–1)
WBC #/AREA URNS HPF: NEGATIVE /[HPF]

## 2023-06-30 PROCEDURE — 99213 OFFICE O/P EST LOW 20 MIN: CPT

## 2023-06-30 PROCEDURE — 81003 URINALYSIS AUTO W/O SCOPE: CPT

## 2023-06-30 PROCEDURE — 81025 URINE PREGNANCY TEST: CPT

## 2023-06-30 RX ORDER — ACETAMINOPHEN 325 MG/1
650 TABLET ORAL EVERY 6 HOURS PRN
COMMUNITY

## 2023-06-30 RX ORDER — IBUPROFEN 800 MG/1
800 TABLET ORAL EVERY 8 HOURS PRN
Qty: 30 TABLET | Refills: 0 | Status: SHIPPED | OUTPATIENT
Start: 2023-06-30

## 2023-06-30 ASSESSMENT — PAIN DESCRIPTION - LOCATION: LOCATION: ABDOMEN

## 2023-06-30 ASSESSMENT — PAIN - FUNCTIONAL ASSESSMENT
PAIN_FUNCTIONAL_ASSESSMENT: 0-10
PAIN_FUNCTIONAL_ASSESSMENT: PREVENTS OR INTERFERES SOME ACTIVE ACTIVITIES AND ADLS

## 2023-06-30 ASSESSMENT — PAIN DESCRIPTION - PAIN TYPE: TYPE: ACUTE PAIN

## 2023-06-30 ASSESSMENT — PAIN DESCRIPTION - ORIENTATION: ORIENTATION: MID;RIGHT

## 2023-06-30 ASSESSMENT — PAIN DESCRIPTION - DESCRIPTORS: DESCRIPTORS: CRAMPING

## 2023-06-30 ASSESSMENT — PAIN SCALES - GENERAL: PAINLEVEL_OUTOF10: 4

## 2023-07-01 ASSESSMENT — ENCOUNTER SYMPTOMS
COUGH: 0
SHORTNESS OF BREATH: 0
EYE REDNESS: 0
VOMITING: 0
EYE ITCHING: 0
DIARRHEA: 0
NAUSEA: 0
CHEST TIGHTNESS: 0
ABDOMINAL PAIN: 1

## 2023-07-26 ENCOUNTER — TELEPHONE (OUTPATIENT)
Dept: FAMILY MEDICINE CLINIC | Age: 23
End: 2023-07-26

## 2023-07-26 ENCOUNTER — OFFICE VISIT (OUTPATIENT)
Dept: FAMILY MEDICINE CLINIC | Age: 23
End: 2023-07-26
Payer: COMMERCIAL

## 2023-07-26 VITALS
HEIGHT: 64 IN | SYSTOLIC BLOOD PRESSURE: 112 MMHG | OXYGEN SATURATION: 99 % | WEIGHT: 101.4 LBS | BODY MASS INDEX: 17.31 KG/M2 | HEART RATE: 81 BPM | DIASTOLIC BLOOD PRESSURE: 62 MMHG | RESPIRATION RATE: 16 BRPM

## 2023-07-26 DIAGNOSIS — J45.40 MODERATE PERSISTENT ASTHMA WITHOUT COMPLICATION: ICD-10-CM

## 2023-07-26 DIAGNOSIS — L85.8 KERATOSIS PILARIS: ICD-10-CM

## 2023-07-26 DIAGNOSIS — Z86.2 HISTORY OF ANEMIA: ICD-10-CM

## 2023-07-26 DIAGNOSIS — L70.0 ACNE VULGARIS: ICD-10-CM

## 2023-07-26 DIAGNOSIS — R14.0 ABDOMINAL BLOATING: Primary | ICD-10-CM

## 2023-07-26 LAB
ANION GAP SERPL CALC-SCNC: 12 MEQ/L (ref 8–16)
BASOPHILS ABSOLUTE: 0 THOU/MM3 (ref 0–0.1)
BASOPHILS NFR BLD AUTO: 0.5 %
BUN SERPL-MCNC: < 2 MG/DL (ref 7–22)
CALCIUM SERPL-MCNC: 9.4 MG/DL (ref 8.5–10.5)
CHLORIDE SERPL-SCNC: 102 MEQ/L (ref 98–111)
CO2 SERPL-SCNC: 25 MEQ/L (ref 23–33)
CREAT SERPL-MCNC: 0.6 MG/DL (ref 0.4–1.2)
DEPRECATED RDW RBC AUTO: 45.8 FL (ref 35–45)
EOSINOPHIL NFR BLD AUTO: 0.9 %
EOSINOPHILS ABSOLUTE: 0.1 THOU/MM3 (ref 0–0.4)
ERYTHROCYTE [DISTWIDTH] IN BLOOD BY AUTOMATED COUNT: 13 % (ref 11.5–14.5)
GFR SERPL CREATININE-BSD FRML MDRD: > 60 ML/MIN/1.73M2
GLUCOSE SERPL-MCNC: 141 MG/DL (ref 70–108)
HCT VFR BLD AUTO: 41.6 % (ref 37–47)
HGB BLD-MCNC: 13.3 GM/DL (ref 12–16)
IMM GRANULOCYTES # BLD AUTO: 0.02 THOU/MM3 (ref 0–0.07)
IMM GRANULOCYTES NFR BLD AUTO: 0.3 %
LYMPHOCYTES ABSOLUTE: 2.7 THOU/MM3 (ref 1–4.8)
LYMPHOCYTES NFR BLD AUTO: 34.7 %
MCH RBC QN AUTO: 30.6 PG (ref 26–33)
MCHC RBC AUTO-ENTMCNC: 32 GM/DL (ref 32.2–35.5)
MCV RBC AUTO: 95.9 FL (ref 81–99)
MONOCYTES ABSOLUTE: 0.3 THOU/MM3 (ref 0.4–1.3)
MONOCYTES NFR BLD AUTO: 3.9 %
NEUTROPHILS NFR BLD AUTO: 59.7 %
NRBC BLD AUTO-RTO: 0 /100 WBC
PLATELET # BLD AUTO: 324 THOU/MM3 (ref 130–400)
PMV BLD AUTO: 11.2 FL (ref 9.4–12.4)
POTASSIUM SERPL-SCNC: 3.7 MEQ/L (ref 3.5–5.2)
RBC # BLD AUTO: 4.34 MILL/MM3 (ref 4.2–5.4)
SEGMENTED NEUTROPHILS ABSOLUTE COUNT: 4.6 THOU/MM3 (ref 1.8–7.7)
SODIUM SERPL-SCNC: 139 MEQ/L (ref 135–145)
WBC # BLD AUTO: 7.7 THOU/MM3 (ref 4.8–10.8)

## 2023-07-26 PROCEDURE — 99214 OFFICE O/P EST MOD 30 MIN: CPT | Performed by: NURSE PRACTITIONER

## 2023-07-26 PROCEDURE — 36415 COLL VENOUS BLD VENIPUNCTURE: CPT | Performed by: NURSE PRACTITIONER

## 2023-07-26 PROCEDURE — G8427 DOCREV CUR MEDS BY ELIG CLIN: HCPCS | Performed by: NURSE PRACTITIONER

## 2023-07-26 PROCEDURE — 1036F TOBACCO NON-USER: CPT | Performed by: NURSE PRACTITIONER

## 2023-07-26 PROCEDURE — G8419 CALC BMI OUT NRM PARAM NOF/U: HCPCS | Performed by: NURSE PRACTITIONER

## 2023-07-26 RX ORDER — ADAPALENE 45 G/G
GEL TOPICAL
Qty: 45 G | Refills: 1 | Status: SHIPPED | OUTPATIENT
Start: 2023-07-26

## 2023-07-26 RX ORDER — ADAPALENE 45 G/G
GEL TOPICAL
COMMUNITY
Start: 2023-06-26 | End: 2023-07-26 | Stop reason: SDUPTHER

## 2023-07-26 RX ORDER — FLUTICASONE PROPIONATE 110 UG/1
2 AEROSOL, METERED RESPIRATORY (INHALATION) 2 TIMES DAILY
Qty: 12 G | Refills: 3 | Status: SHIPPED | OUTPATIENT
Start: 2023-07-26 | End: 2024-07-25

## 2023-07-26 RX ORDER — BENZOYL PEROXIDE 50 MG/ML
LIQUID TOPICAL
COMMUNITY
Start: 2023-06-26 | End: 2023-07-26

## 2023-07-26 RX ORDER — CLINDAMYCIN PHOSPHATE 10 UG/ML
LOTION TOPICAL
COMMUNITY
Start: 2023-03-13 | End: 2023-07-26

## 2023-07-26 SDOH — ECONOMIC STABILITY: HOUSING INSECURITY
IN THE LAST 12 MONTHS, WAS THERE A TIME WHEN YOU DID NOT HAVE A STEADY PLACE TO SLEEP OR SLEPT IN A SHELTER (INCLUDING NOW)?: NO

## 2023-07-26 SDOH — ECONOMIC STABILITY: INCOME INSECURITY: HOW HARD IS IT FOR YOU TO PAY FOR THE VERY BASICS LIKE FOOD, HOUSING, MEDICAL CARE, AND HEATING?: SOMEWHAT HARD

## 2023-07-26 SDOH — ECONOMIC STABILITY: FOOD INSECURITY: WITHIN THE PAST 12 MONTHS, THE FOOD YOU BOUGHT JUST DIDN'T LAST AND YOU DIDN'T HAVE MONEY TO GET MORE.: NEVER TRUE

## 2023-07-26 SDOH — ECONOMIC STABILITY: FOOD INSECURITY: WITHIN THE PAST 12 MONTHS, YOU WORRIED THAT YOUR FOOD WOULD RUN OUT BEFORE YOU GOT MONEY TO BUY MORE.: NEVER TRUE

## 2023-07-26 ASSESSMENT — ENCOUNTER SYMPTOMS
ABDOMINAL DISTENTION: 0
CHEST TIGHTNESS: 0
NAUSEA: 0
CONSTIPATION: 0
ABDOMINAL PAIN: 0
WHEEZING: 0
COLOR CHANGE: 0
EYE DISCHARGE: 0
EYE ITCHING: 0
SINUS PRESSURE: 0
BACK PAIN: 0
CHOKING: 0
VOICE CHANGE: 0

## 2023-07-26 NOTE — PROGRESS NOTES
Venipuncture obtained from  right arm. Patient tolerated the procedure without complications or complaints.
or diaphoretic. HENT:      Head: Normocephalic and atraumatic. Right Ear: Tympanic membrane, ear canal and external ear normal. Tympanic membrane is not injected or erythematous. Left Ear: Tympanic membrane, ear canal and external ear normal. Tympanic membrane is not injected or erythematous. Nose: Nose normal.      Mouth/Throat:      Pharynx: Oropharynx is clear. Uvula midline. Eyes:      General:         Right eye: No discharge. Left eye: No discharge. Conjunctiva/sclera: Conjunctivae normal.      Pupils: Pupils are equal, round, and reactive to light. Neck:      Thyroid: No thyromegaly. Trachea: Trachea normal.   Cardiovascular:      Rate and Rhythm: Normal rate and regular rhythm. Pulses: Normal pulses. Heart sounds: Normal heart sounds, S1 normal and S2 normal. No murmur heard. No friction rub. No gallop. Pulmonary:      Effort: Pulmonary effort is normal. No respiratory distress. Breath sounds: Normal breath sounds. No wheezing or rales. Chest:      Chest wall: No tenderness. Abdominal:      General: Bowel sounds are normal. There is no distension. Palpations: Abdomen is soft. There is no mass. Tenderness: There is no abdominal tenderness. There is no guarding or rebound. Musculoskeletal:         General: No tenderness or deformity. Normal range of motion. Cervical back: Full passive range of motion without pain, normal range of motion and neck supple. No rigidity or tenderness. Lymphadenopathy:      Cervical: No cervical adenopathy. Skin:     General: Skin is warm and dry. Capillary Refill: Capillary refill takes less than 2 seconds. Comments: Back of arms and thighs with small raised skin colored bumps, consistent with keratosis pilaris    Neurological:      Mental Status: She is alert and oriented to person, place, and time. Deep Tendon Reflexes: Reflexes are normal and symmetric.            An electronic

## 2023-07-26 NOTE — TELEPHONE ENCOUNTER
Pharmacy called and stated they do not do the Salicylic Acid-Urea 0-04.5 % CREA   Because it is a compound prescription. She recommended using [facility]. Pharmacy stated they would call patient and inform of pharmacy change. Can we send a new script to this pharmacy?

## 2023-07-27 LAB
IRON SATN MFR SERPL: 24 % (ref 20–50)
IRON SERPL-MCNC: 85 UG/DL (ref 50–170)
TIBC SERPL-MCNC: 351 UG/DL (ref 171–450)

## 2023-07-30 DIAGNOSIS — R79.89 LOW BUN: ICD-10-CM

## 2023-07-30 DIAGNOSIS — R73.09 ELEVATED GLUCOSE: Primary | ICD-10-CM

## 2023-07-31 ENCOUNTER — TELEPHONE (OUTPATIENT)
Dept: FAMILY MEDICINE CLINIC | Age: 23
End: 2023-07-31

## 2023-07-31 NOTE — RESULT ENCOUNTER NOTE
I am ordering a A1C and a repeat BMP, encourage her to drink at least 64 ounces of water daily, and especially a few days before getting labs rechecked.

## 2023-07-31 NOTE — TELEPHONE ENCOUNTER
----- Message from BRENDA Stacy CNP sent at 7/30/2023 10:04 PM EDT -----  I am ordering a A1C and a repeat BMP, encourage her to drink at least 64 ounces of water daily, and especially a few days before getting labs rechecked.

## 2023-08-14 ENCOUNTER — HOSPITAL ENCOUNTER (EMERGENCY)
Age: 23
Discharge: HOME | End: 2023-08-14
Payer: COMMERCIAL

## 2023-08-14 VITALS
SYSTOLIC BLOOD PRESSURE: 134 MMHG | BODY MASS INDEX: 17.85 KG/M2 | OXYGEN SATURATION: 99 % | WEIGHT: 104 LBS | DIASTOLIC BLOOD PRESSURE: 82 MMHG | TEMPERATURE: 98.1 F | RESPIRATION RATE: 16 BRPM | HEART RATE: 78 BPM

## 2023-08-14 DIAGNOSIS — J06.9 UPPER RESPIRATORY TRACT INFECTION, UNSPECIFIED TYPE: Primary | ICD-10-CM

## 2023-08-14 LAB
S PYO AG THROAT QL: NEGATIVE
SARS-COV-2 RDRP RESP QL NAA+PROBE: NOT  DETECTED

## 2023-08-14 PROCEDURE — 99213 OFFICE O/P EST LOW 20 MIN: CPT | Performed by: NURSE PRACTITIONER

## 2023-08-14 PROCEDURE — 99213 OFFICE O/P EST LOW 20 MIN: CPT

## 2023-08-14 PROCEDURE — 87635 SARS-COV-2 COVID-19 AMP PRB: CPT

## 2023-08-14 PROCEDURE — 87651 STREP A DNA AMP PROBE: CPT

## 2023-08-14 RX ORDER — CETIRIZINE HYDROCHLORIDE 10 MG/1
10 TABLET ORAL DAILY
Qty: 30 TABLET | Refills: 0 | Status: SHIPPED | OUTPATIENT
Start: 2023-08-14 | End: 2023-09-13

## 2023-08-14 RX ORDER — GUAIFENESIN, PSEUDOEPHEDRINE HYDROCHLORIDE 600; 60 MG/1; MG/1
1 TABLET, EXTENDED RELEASE ORAL EVERY 12 HOURS PRN
Qty: 18 TABLET | Refills: 0 | Status: SHIPPED | OUTPATIENT
Start: 2023-08-14 | End: 2023-08-28

## 2023-08-14 ASSESSMENT — PAIN DESCRIPTION - DESCRIPTORS: DESCRIPTORS: ACHING;POUNDING

## 2023-08-14 ASSESSMENT — ENCOUNTER SYMPTOMS
DIARRHEA: 0
RHINORRHEA: 0
NAUSEA: 1
SORE THROAT: 1
COUGH: 0
CHEST TIGHTNESS: 0
SHORTNESS OF BREATH: 0
VOMITING: 0

## 2023-08-14 ASSESSMENT — PAIN SCALES - GENERAL: PAINLEVEL_OUTOF10: 7

## 2023-08-14 ASSESSMENT — PAIN DESCRIPTION - PAIN TYPE: TYPE: ACUTE PAIN

## 2023-08-14 ASSESSMENT — PAIN - FUNCTIONAL ASSESSMENT
PAIN_FUNCTIONAL_ASSESSMENT: PREVENTS OR INTERFERES SOME ACTIVE ACTIVITIES AND ADLS
PAIN_FUNCTIONAL_ASSESSMENT: 0-10

## 2023-08-14 ASSESSMENT — PAIN DESCRIPTION - LOCATION: LOCATION: ABDOMEN;HEAD

## 2023-08-14 ASSESSMENT — PAIN DESCRIPTION - FREQUENCY: FREQUENCY: CONTINUOUS

## 2023-08-14 NOTE — ED PROVIDER NOTES
[facility]  Urgent Care Encounter       CHIEF COMPLAINT       Chief Complaint   Patient presents with    Abdominal Pain    Dizziness    Fever     100 last night    Headache    Pharyngitis    Chills       Nurses Notes reviewed and I agree except as noted in the HPI. HISTORY OF PRESENT ILLNESS   Jacinta Romero is a 23 y.o. female who presents to the [place] urgent care for evaluation of fever and sore throat. Reports the symptoms started yesterday. Reports associated symptoms of headache, nausea, fever, chills, dizziness, congestion, and PND. Denies known exposures. The history is provided by the patient. No  was used. REVIEW OF SYSTEMS     Review of Systems   Constitutional:  Positive for chills, fatigue and fever. Negative for activity change and appetite change. HENT:  Positive for sore throat. Negative for ear discharge, ear pain and rhinorrhea. Respiratory:  Negative for cough, chest tightness and shortness of breath. Cardiovascular:  Negative for chest pain. Gastrointestinal:  Positive for nausea. Negative for diarrhea and vomiting. Genitourinary:  Negative for dysuria. Skin:  Negative for rash. Allergic/Immunologic: Negative for environmental allergies and food allergies. Neurological:  Positive for dizziness and headaches. PAST MEDICAL HISTORY         Diagnosis Date    Allergy     Anxiety     Mono exposure 02/2013    dx    Strep throat        SURGICALHISTORY     Patient  has a past surgical history that includes Mandible surgery.     CURRENT MEDICATIONS       Discharge Medication List as of 8/14/2023  6:24 PM        CONTINUE these medications which have NOT CHANGED    Details   adapalene (DIFFERIN) 0.1 % gel Apply daily to clean face, Disp-45 g, R-1, Normal      fluticasone (FLOVENT HFA) 110 MCG/ACT inhaler Inhale 2 puffs into the lungs 2 times daily, Disp-12 g, [id number]      Salicylic Acid-Urea 3-42.0 % CREA Apply to affected

## 2023-08-15 ENCOUNTER — TELEPHONE (OUTPATIENT)
Dept: FAMILY MEDICINE CLINIC | Age: 23
End: 2023-08-15

## 2023-11-15 DIAGNOSIS — F41.9 ANXIETY: ICD-10-CM

## 2023-11-15 NOTE — TELEPHONE ENCOUNTER
Patient's last appointment was : 7/26/2023  Patient's next appointment is : Visit date not found  Last refilled:9/28/22, #60, 3 refill

## 2023-11-27 RX ORDER — BUSPIRONE HYDROCHLORIDE 5 MG/1
TABLET ORAL
Qty: 60 TABLET | Refills: 3 | OUTPATIENT
Start: 2023-11-27

## 2024-01-11 ENCOUNTER — OFFICE VISIT (OUTPATIENT)
Dept: FAMILY MEDICINE CLINIC | Age: 24
End: 2024-01-11
Payer: MEDICAID

## 2024-01-11 VITALS
RESPIRATION RATE: 16 BRPM | HEIGHT: 64 IN | DIASTOLIC BLOOD PRESSURE: 60 MMHG | OXYGEN SATURATION: 99 % | BODY MASS INDEX: 18.44 KG/M2 | WEIGHT: 108 LBS | HEART RATE: 71 BPM | SYSTOLIC BLOOD PRESSURE: 102 MMHG
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
## 2024-01-11 PROCEDURE — G8482 FLU IMMUNIZE ORDER/ADMIN: HCPCS | Performed by: NURSE PRACTITIONER

## 2024-01-11 PROCEDURE — 90715 TDAP VACCINE 7 YRS/> IM: CPT | Performed by: NURSE PRACTITIONER

## 2024-01-11 PROCEDURE — G8427 DOCREV CUR MEDS BY ELIG CLIN: HCPCS | Performed by: NURSE PRACTITIONER

## 2024-01-11 PROCEDURE — 90471 IMMUNIZATION ADMIN: CPT | Performed by: NURSE PRACTITIONER

## 2024-01-11 PROCEDURE — 99214 OFFICE O/P EST MOD 30 MIN: CPT | Performed by: NURSE PRACTITIONER

## 2024-01-11 PROCEDURE — 1036F TOBACCO NON-USER: CPT | Performed by: NURSE PRACTITIONER

## 2024-01-11 PROCEDURE — G8420 CALC BMI NORM PARAMETERS: HCPCS | Performed by: NURSE PRACTITIONER

## 2024-01-11 RX ORDER — AMOXICILLIN AND CLAVULANATE POTASSIUM 875; 125 MG/1; MG/1
1 TABLET, FILM COATED ORAL 2 TIMES DAILY
Qty: 14 TABLET | Refills: 0 | Status: SHIPPED | OUTPATIENT
Start: 2024-01-11 | End: 2024-01-18

## 2024-01-11 NOTE — PROGRESS NOTES
After obtaining consent, and per orders of Mitzi GRANADOS, injection of Boostrix given in Left deltoid by TRINI POP LPN. Patient instructed to remain in clinic for 20 minutes afterwards, and to report any adverse reaction to me immediately. Patient tolerated well. VIS given to patient. C reviewed prior to administration.     Immunizations Administered       Name Date Dose Route    TDaP, ADACEL (age 10y-64y), BOOSTRIX (age 10y+), IM, 0.5mL 1/11/2024 0.5 mL Intramuscular    Site: Deltoid- Left    Lot: [id number]    NDC: 08039-591-88           
hematuria, vaginal discharge and vaginal pain.   Musculoskeletal:  Negative for arthralgias, back pain, gait problem, neck pain and neck stiffness.   Skin:  Positive for color change. Negative for rash.   Neurological:  Negative for dizziness, syncope, speech difficulty, light-headedness, numbness and headaches.   Psychiatric/Behavioral:  Negative for behavioral problems, confusion, self-injury and suicidal ideas. The patient is not nervous/anxious.        Physical Exam  Vitals and nursing note reviewed.   Constitutional:       General: She is not in acute distress.     Appearance: She is well-developed. She is not diaphoretic.   HENT:      Head: Normocephalic and atraumatic.      Right Ear: Tympanic membrane and external ear normal. Tympanic membrane is not injected or erythematous.      Left Ear: Tympanic membrane and external ear normal. Tympanic membrane is not injected or erythematous.      Nose: Nose normal.      Mouth/Throat:      Pharynx: Uvula midline.   Eyes:      General:         Right eye: No discharge.         Left eye: No discharge.      Conjunctiva/sclera: Conjunctivae normal.      Pupils: Pupils are equal, round, and reactive to light.   Neck:      Thyroid: No thyromegaly.      Trachea: Trachea normal.   Cardiovascular:      Rate and Rhythm: Normal rate and regular rhythm.      Pulses: Normal pulses.      Heart sounds: Normal heart sounds, S1 normal and S2 normal. No murmur heard.     No friction rub. No gallop.   Pulmonary:      Effort: Pulmonary effort is normal. No respiratory distress.      Breath sounds: Normal breath sounds. No wheezing or rales.   Chest:      Chest wall: No tenderness.   Abdominal:      General: Bowel sounds are normal. There is no distension.      Palpations: Abdomen is soft. There is no mass.      Tenderness: There is no abdominal tenderness. There is no guarding or rebound.   Musculoskeletal:         General: No tenderness or deformity. Normal range of motion.      Cervical

## 2024-01-21 DIAGNOSIS — F41.9 ANXIETY: ICD-10-CM

## 2024-01-21 DIAGNOSIS — J45.40 MODERATE PERSISTENT ASTHMA WITHOUT COMPLICATION: ICD-10-CM

## 2024-01-21 DIAGNOSIS — K21.9 GASTROESOPHAGEAL REFLUX DISEASE, UNSPECIFIED WHETHER ESOPHAGITIS PRESENT: Primary | ICD-10-CM

## 2024-01-22 RX ORDER — BUSPIRONE HYDROCHLORIDE 5 MG/1
TABLET ORAL
Qty: 60 TABLET | Refills: 3 | Status: SHIPPED | OUTPATIENT
Start: 2024-01-22

## 2024-01-22 RX ORDER — FLUTICASONE PROPIONATE 110 UG/1
2 AEROSOL, METERED RESPIRATORY (INHALATION) 2 TIMES DAILY
Qty: 12 G | Refills: 11 | Status: SHIPPED | OUTPATIENT
Start: 2024-01-22 | End: 2025-01-21

## 2024-01-22 RX ORDER — FAMOTIDINE 20 MG/1
TABLET, FILM COATED ORAL
Qty: 30 TABLET | Refills: 11 | Status: SHIPPED | OUTPATIENT
Start: 2024-01-22

## 2024-01-22 NOTE — TELEPHONE ENCOUNTER
Patient's last appointment was : 1/11/2024  Patient's next appointment is : Visit date not found  Last refilled:  Famotidine: 1/4/23, #30, 11 refills  Buspar:9/28/22, #60, 3 refills  Flovent: 7/26/23, 12 g, 3 refills

## 2024-02-16 RX ORDER — LEVOCETIRIZINE DIHYDROCHLORIDE 5 MG/1
5 TABLET, FILM COATED ORAL NIGHTLY
Qty: 90 TABLET | Refills: 3 | Status: SHIPPED | OUTPATIENT
Start: 2024-02-16

## 2024-02-16 NOTE — TELEPHONE ENCOUNTER
Patient's last appointment was : 1/11/2024  Patient's next appointment is : Visit date not found  Last sent in: 10/20/21 90 day supply with 3 refills       Pt called requesting a refill states that medication is prescribed by allergist but she needs an in person appt with them before they will send in refills. Pt state she is out and is requesting PCP to send in a refill?

## 2024-05-10 NOTE — TELEPHONE ENCOUNTER
Patient's last appointment was : 9/8/2021  Patient's next appointment is : Visit date not found  Last refilled:  xyzal 7/27/21  pepcid 10/19/21 207

## 2024-08-12 ENCOUNTER — TELEMEDICINE (OUTPATIENT)
Dept: FAMILY MEDICINE CLINIC | Age: 24
End: 2024-08-12

## 2024-08-12 DIAGNOSIS — F41.9 ANXIETY: Primary | ICD-10-CM

## 2024-08-12 DIAGNOSIS — F32.A DEPRESSION, UNSPECIFIED DEPRESSION TYPE: ICD-10-CM

## 2024-08-12 SDOH — ECONOMIC STABILITY: FOOD INSECURITY: WITHIN THE PAST 12 MONTHS, THE FOOD YOU BOUGHT JUST DIDN'T LAST AND YOU DIDN'T HAVE MONEY TO GET MORE.: NEVER TRUE

## 2024-08-12 SDOH — ECONOMIC STABILITY: INCOME INSECURITY: HOW HARD IS IT FOR YOU TO PAY FOR THE VERY BASICS LIKE FOOD, HOUSING, MEDICAL CARE, AND HEATING?: NOT VERY HARD

## 2024-08-12 SDOH — ECONOMIC STABILITY: FOOD INSECURITY: WITHIN THE PAST 12 MONTHS, YOU WORRIED THAT YOUR FOOD WOULD RUN OUT BEFORE YOU GOT MONEY TO BUY MORE.: NEVER TRUE

## 2024-08-12 SDOH — ECONOMIC STABILITY: TRANSPORTATION INSECURITY
IN THE PAST 12 MONTHS, HAS LACK OF TRANSPORTATION KEPT YOU FROM MEETINGS, WORK, OR FROM GETTING THINGS NEEDED FOR DAILY LIVING?: NO

## 2024-08-12 ASSESSMENT — ENCOUNTER SYMPTOMS
SHORTNESS OF BREATH: 0
VOMITING: 0
CONSTIPATION: 0
BACK PAIN: 0
SINUS PRESSURE: 0
EYE DISCHARGE: 0
CHOKING: 0
COLOR CHANGE: 0
ABDOMINAL PAIN: 0
WHEEZING: 0
EYE PAIN: 0
NAUSEA: 0
VOICE CHANGE: 0
EYE ITCHING: 0
ABDOMINAL DISTENTION: 0
CHEST TIGHTNESS: 0

## 2024-08-12 NOTE — PROGRESS NOTES
SRPX  [facility]  [address] [address]  Dept: [phone]  Loc: [phone]    Jacinta Li (:  2000) is a 23 y.o. female,Established patient, here for evaluation of the following chief complaint(s):  No chief complaint on file.      ASSESSMENT/PLAN:  1. Anxiety  2. Depression, unspecified depression type  Patient reports her mood is much more controlled now since stopping her OCP.   She is taking buspar 1-2 times a day, denies any SI/HI    In her last semester, her grades were not good, and this dropped her gpa.   She is appealing this with her school, but needing a note from PCP    Taking buspar as needed for anxiety.     She reports she [facility].   Has free online therapy through [employer], and plans to get that set up.     She is requesting a Document stating how her anxiety and depression seems to have affected her grades.   Has a plan moving forward.   Has agreed to establish with therapy services at the [facility].     Last ester August- December, stopped OCP in January. And since stopping reports her depression and anxiety have improved greatly.     Jacinta Li, was evaluated through a synchronous (real-time) audio-video encounter. The patient (or guardian if applicable) is aware that this is a billable service, which includes applicable co-pays. This Virtual Visit was conducted with patient's (and/or legal guardian's) consent. Patient identification was verified, and a caregiver was present when appropriate.   The patient was located at Home: [address] [address]  Provider was located at Facility (Appt Dept): [address] [place] [address]  Confirm you are appropriately licensed, registered, or certified to deliver care in the state where the patient is located as indicated above. If you are not or unsure, please re-schedule the visit: Yes, I

## 2024-08-14 ENCOUNTER — PATIENT MESSAGE (OUTPATIENT)
Dept: FAMILY MEDICINE CLINIC | Age: 24
End: 2024-08-14